# Patient Record
Sex: FEMALE | Race: BLACK OR AFRICAN AMERICAN | NOT HISPANIC OR LATINO
[De-identification: names, ages, dates, MRNs, and addresses within clinical notes are randomized per-mention and may not be internally consistent; named-entity substitution may affect disease eponyms.]

---

## 2018-12-06 ENCOUNTER — TRANSCRIPTION ENCOUNTER (OUTPATIENT)
Age: 23
End: 2018-12-06

## 2019-04-24 PROBLEM — Z00.00 ENCOUNTER FOR PREVENTIVE HEALTH EXAMINATION: Status: ACTIVE | Noted: 2019-04-24

## 2019-04-26 ENCOUNTER — APPOINTMENT (OUTPATIENT)
Dept: ULTRASOUND IMAGING | Facility: CLINIC | Age: 24
End: 2019-04-26
Payer: COMMERCIAL

## 2019-04-26 ENCOUNTER — TRANSCRIPTION ENCOUNTER (OUTPATIENT)
Age: 24
End: 2019-04-26

## 2019-04-26 ENCOUNTER — OUTPATIENT (OUTPATIENT)
Dept: OUTPATIENT SERVICES | Facility: HOSPITAL | Age: 24
LOS: 1 days | End: 2019-04-26
Payer: COMMERCIAL

## 2019-04-26 DIAGNOSIS — Z00.8 ENCOUNTER FOR OTHER GENERAL EXAMINATION: ICD-10-CM

## 2019-04-26 DIAGNOSIS — Z98.89 OTHER SPECIFIED POSTPROCEDURAL STATES: Chronic | ICD-10-CM

## 2019-04-26 PROCEDURE — 76536 US EXAM OF HEAD AND NECK: CPT | Mod: 26

## 2019-04-26 PROCEDURE — 76536 US EXAM OF HEAD AND NECK: CPT

## 2019-06-07 ENCOUNTER — APPOINTMENT (OUTPATIENT)
Dept: ULTRASOUND IMAGING | Facility: HOSPITAL | Age: 24
End: 2019-06-07

## 2019-07-27 ENCOUNTER — RESULT REVIEW (OUTPATIENT)
Age: 24
End: 2019-07-27

## 2019-08-23 ENCOUNTER — APPOINTMENT (OUTPATIENT)
Dept: ULTRASOUND IMAGING | Facility: IMAGING CENTER | Age: 24
End: 2019-08-23

## 2019-10-01 ENCOUNTER — OUTPATIENT (OUTPATIENT)
Dept: OUTPATIENT SERVICES | Facility: HOSPITAL | Age: 24
LOS: 1 days | End: 2019-10-01
Payer: COMMERCIAL

## 2019-10-01 ENCOUNTER — APPOINTMENT (OUTPATIENT)
Dept: ULTRASOUND IMAGING | Facility: IMAGING CENTER | Age: 24
End: 2019-10-01
Payer: COMMERCIAL

## 2019-10-01 DIAGNOSIS — Z98.89 OTHER SPECIFIED POSTPROCEDURAL STATES: Chronic | ICD-10-CM

## 2019-10-01 DIAGNOSIS — Z00.8 ENCOUNTER FOR OTHER GENERAL EXAMINATION: ICD-10-CM

## 2019-10-01 PROCEDURE — 76830 TRANSVAGINAL US NON-OB: CPT | Mod: 26

## 2019-10-01 PROCEDURE — 76856 US EXAM PELVIC COMPLETE: CPT

## 2019-10-01 PROCEDURE — 76856 US EXAM PELVIC COMPLETE: CPT | Mod: 26

## 2019-10-01 PROCEDURE — 76830 TRANSVAGINAL US NON-OB: CPT

## 2019-10-16 ENCOUNTER — APPOINTMENT (OUTPATIENT)
Dept: OBGYN | Facility: CLINIC | Age: 24
End: 2019-10-16

## 2019-10-31 ENCOUNTER — APPOINTMENT (OUTPATIENT)
Dept: BARIATRICS/WEIGHT MGMT | Facility: CLINIC | Age: 24
End: 2019-10-31

## 2020-02-26 ENCOUNTER — APPOINTMENT (OUTPATIENT)
Dept: OBGYN | Facility: CLINIC | Age: 25
End: 2020-02-26

## 2020-03-11 ENCOUNTER — TRANSCRIPTION ENCOUNTER (OUTPATIENT)
Age: 25
End: 2020-03-11

## 2020-04-26 ENCOUNTER — MESSAGE (OUTPATIENT)
Age: 25
End: 2020-04-26

## 2020-05-05 ENCOUNTER — APPOINTMENT (OUTPATIENT)
Dept: ANTEPARTUM | Facility: CLINIC | Age: 25
End: 2020-05-05
Payer: COMMERCIAL

## 2020-05-05 ENCOUNTER — APPOINTMENT (OUTPATIENT)
Dept: ANTEPARTUM | Facility: CLINIC | Age: 25
End: 2020-05-05

## 2020-05-05 ENCOUNTER — ASOB RESULT (OUTPATIENT)
Age: 25
End: 2020-05-05

## 2020-05-05 PROCEDURE — 36416 COLLJ CAPILLARY BLOOD SPEC: CPT

## 2020-05-05 PROCEDURE — 76813 OB US NUCHAL MEAS 1 GEST: CPT

## 2020-05-05 PROCEDURE — 76801 OB US < 14 WKS SINGLE FETUS: CPT

## 2020-05-06 ENCOUNTER — APPOINTMENT (OUTPATIENT)
Dept: ANTEPARTUM | Facility: CLINIC | Age: 25
End: 2020-05-06

## 2020-05-06 ENCOUNTER — LABORATORY RESULT (OUTPATIENT)
Age: 25
End: 2020-05-06

## 2020-06-19 ENCOUNTER — APPOINTMENT (OUTPATIENT)
Dept: ANTEPARTUM | Facility: CLINIC | Age: 25
End: 2020-06-19
Payer: COMMERCIAL

## 2020-06-19 ENCOUNTER — ASOB RESULT (OUTPATIENT)
Age: 25
End: 2020-06-19

## 2020-06-19 PROCEDURE — 76811 OB US DETAILED SNGL FETUS: CPT

## 2020-08-28 ENCOUNTER — APPOINTMENT (OUTPATIENT)
Dept: ANTEPARTUM | Facility: CLINIC | Age: 25
End: 2020-08-28
Payer: COMMERCIAL

## 2020-08-28 ENCOUNTER — ASOB RESULT (OUTPATIENT)
Age: 25
End: 2020-08-28

## 2020-08-28 PROCEDURE — 76816 OB US FOLLOW-UP PER FETUS: CPT

## 2020-09-04 ENCOUNTER — APPOINTMENT (OUTPATIENT)
Dept: ANTEPARTUM | Facility: CLINIC | Age: 25
End: 2020-09-04

## 2020-10-14 ENCOUNTER — APPOINTMENT (OUTPATIENT)
Dept: ANTEPARTUM | Facility: CLINIC | Age: 25
End: 2020-10-14

## 2020-10-14 ENCOUNTER — APPOINTMENT (OUTPATIENT)
Dept: ANTEPARTUM | Facility: CLINIC | Age: 25
End: 2020-10-14
Payer: COMMERCIAL

## 2020-10-14 ENCOUNTER — ASOB RESULT (OUTPATIENT)
Age: 25
End: 2020-10-14

## 2020-10-14 PROCEDURE — 76818 FETAL BIOPHYS PROFILE W/NST: CPT

## 2020-10-14 PROCEDURE — 76816 OB US FOLLOW-UP PER FETUS: CPT

## 2020-10-21 ENCOUNTER — APPOINTMENT (OUTPATIENT)
Dept: ANTEPARTUM | Facility: CLINIC | Age: 25
End: 2020-10-21

## 2020-10-21 ENCOUNTER — ASOB RESULT (OUTPATIENT)
Age: 25
End: 2020-10-21

## 2020-10-21 ENCOUNTER — APPOINTMENT (OUTPATIENT)
Dept: ANTEPARTUM | Facility: CLINIC | Age: 25
End: 2020-10-21
Payer: COMMERCIAL

## 2020-10-21 ENCOUNTER — TRANSCRIPTION ENCOUNTER (OUTPATIENT)
Age: 25
End: 2020-10-21

## 2020-10-21 ENCOUNTER — INPATIENT (INPATIENT)
Facility: HOSPITAL | Age: 25
LOS: 2 days | Discharge: HOME | End: 2020-10-24
Attending: OBSTETRICS & GYNECOLOGY | Admitting: OBSTETRICS & GYNECOLOGY
Payer: COMMERCIAL

## 2020-10-21 VITALS
RESPIRATION RATE: 18 BRPM | DIASTOLIC BLOOD PRESSURE: 81 MMHG | SYSTOLIC BLOOD PRESSURE: 128 MMHG | TEMPERATURE: 99 F | HEART RATE: 99 BPM

## 2020-10-21 DIAGNOSIS — Z98.89 OTHER SPECIFIED POSTPROCEDURAL STATES: Chronic | ICD-10-CM

## 2020-10-21 DIAGNOSIS — O26.899 OTHER SPECIFIED PREGNANCY RELATED CONDITIONS, UNSPECIFIED TRIMESTER: ICD-10-CM

## 2020-10-21 DIAGNOSIS — Z3A.00 WEEKS OF GESTATION OF PREGNANCY NOT SPECIFIED: ICD-10-CM

## 2020-10-21 LAB
BASOPHILS # BLD AUTO: 0.03 K/UL — SIGNIFICANT CHANGE UP (ref 0–0.2)
BASOPHILS NFR BLD AUTO: 0.3 % — SIGNIFICANT CHANGE UP (ref 0–2)
BLD GP AB SCN SERPL QL: NEGATIVE — SIGNIFICANT CHANGE UP
EOSINOPHIL # BLD AUTO: 0.03 K/UL — SIGNIFICANT CHANGE UP (ref 0–0.5)
EOSINOPHIL NFR BLD AUTO: 0.3 % — SIGNIFICANT CHANGE UP (ref 0–6)
HCT VFR BLD CALC: 30.4 % — LOW (ref 34.5–45)
HGB BLD-MCNC: 9.1 G/DL — LOW (ref 11.5–15.5)
IMM GRANULOCYTES NFR BLD AUTO: 3.5 % — HIGH (ref 0–1.5)
LYMPHOCYTES # BLD AUTO: 1.6 K/UL — SIGNIFICANT CHANGE UP (ref 1–3.3)
LYMPHOCYTES # BLD AUTO: 14.3 % — SIGNIFICANT CHANGE UP (ref 13–44)
MCHC RBC-ENTMCNC: 21.7 PG — LOW (ref 27–34)
MCHC RBC-ENTMCNC: 29.9 % — LOW (ref 32–36)
MCV RBC AUTO: 72.4 FL — LOW (ref 80–100)
MONOCYTES # BLD AUTO: 0.95 K/UL — HIGH (ref 0–0.9)
MONOCYTES NFR BLD AUTO: 8.5 % — SIGNIFICANT CHANGE UP (ref 2–14)
NEUTROPHILS # BLD AUTO: 8.19 K/UL — HIGH (ref 1.8–7.4)
NEUTROPHILS NFR BLD AUTO: 73.1 % — SIGNIFICANT CHANGE UP (ref 43–77)
NRBC # FLD: 0 K/UL — SIGNIFICANT CHANGE UP (ref 0–0)
PLATELET # BLD AUTO: 313 K/UL — SIGNIFICANT CHANGE UP (ref 150–400)
PMV BLD: 11.2 FL — SIGNIFICANT CHANGE UP (ref 7–13)
RBC # BLD: 4.2 M/UL — SIGNIFICANT CHANGE UP (ref 3.8–5.2)
RBC # FLD: 16.3 % — HIGH (ref 10.3–14.5)
RH IG SCN BLD-IMP: POSITIVE — SIGNIFICANT CHANGE UP
RH IG SCN BLD-IMP: POSITIVE — SIGNIFICANT CHANGE UP
WBC # BLD: 11.19 K/UL — HIGH (ref 3.8–10.5)
WBC # FLD AUTO: 11.19 K/UL — HIGH (ref 3.8–10.5)

## 2020-10-21 PROCEDURE — 76819 FETAL BIOPHYS PROFIL W/O NST: CPT

## 2020-10-21 PROCEDURE — 99072 ADDL SUPL MATRL&STAF TM PHE: CPT

## 2020-10-21 RX ORDER — SODIUM CHLORIDE 9 MG/ML
1000 INJECTION, SOLUTION INTRAVENOUS ONCE
Refills: 0 | Status: DISCONTINUED | OUTPATIENT
Start: 2020-10-21 | End: 2020-10-21

## 2020-10-21 RX ORDER — SODIUM CHLORIDE 9 MG/ML
1000 INJECTION, SOLUTION INTRAVENOUS
Refills: 0 | Status: DISCONTINUED | OUTPATIENT
Start: 2020-10-21 | End: 2020-10-21

## 2020-10-21 RX ORDER — CITRIC ACID/SODIUM CITRATE 300-500 MG
30 SOLUTION, ORAL ORAL ONCE
Refills: 0 | Status: DISCONTINUED | OUTPATIENT
Start: 2020-10-21 | End: 2020-10-21

## 2020-10-21 RX ORDER — FAMOTIDINE 10 MG/ML
20 INJECTION INTRAVENOUS ONCE
Refills: 0 | Status: DISCONTINUED | OUTPATIENT
Start: 2020-10-21 | End: 2020-10-21

## 2020-10-21 RX ORDER — METOCLOPRAMIDE HCL 10 MG
10 TABLET ORAL ONCE
Refills: 0 | Status: DISCONTINUED | OUTPATIENT
Start: 2020-10-21 | End: 2020-10-21

## 2020-10-21 RX ORDER — OXYTOCIN 10 UNIT/ML
333.33 VIAL (ML) INJECTION
Qty: 20 | Refills: 0 | Status: DISCONTINUED | OUTPATIENT
Start: 2020-10-21 | End: 2020-10-21

## 2020-10-21 NOTE — OB PROVIDER H&P - NSHPPHYSICALEXAM_GEN_ALL_CORE
Vitals:   T(C): 37.3 (10-21-20 @ 17:56), Max: 37.4 (10-21-20 @ 17:41)  HR: 99 (10-21-20 @ 17:41) (99 - 99)  BP: 128/81 (10-21-20 @ 17:41) (128/81 - 128/81)  RR: 18 (10-21-20 @ 17:41) (18 - 18)  SpO2: --    Exam:       General: Patient is resting comfortably in bed, alert and oriented, NAD       CV: RRR       PULM: CTA BL       Abd: Soft, nontender. Gravid       Extremities: Full ROM, no edema      TAUS: Cephalic, VIKI=0. Anterior placenta      SVE: 0/0/-3         EFM: 135 moderate variability +accels -decels        [place]: Ctx q4min, not felt by pt

## 2020-10-21 NOTE — OB PROVIDER H&P - ASSESSMENT
Assessment: 24yo  presents at 36w5d with oligohydramnios, for repeat  section:     Plan:    - Repeat  section    - Fetus: Category I tracing. Continuous EFM/toco    - Routine preoperative labs: Pepcid, bicitra, reglan    - Analgesia: Anesthesia consult     Martha Sadler PGY-2  Discussed with Dr. Pompa

## 2020-10-21 NOTE — CHART NOTE - NSCHARTNOTEFT_GEN_A_CORE
HERBERT FELLOW CONTACT NOTE:     26yo  presenting at 36w5d with oligohydramnios noted on ATU ultrasound. Pateint was seen at the ATU last week with low normal VIKI noted at 7cm, and had f/u scheduled today. When presenting today, VIKI was noted to be at 4.95cm and pateint sent to triage for r/o rupture. W/o on L&D negative for PPROM, and repeat US failed to detect any 2x2cm pockets of fluid. Prenatal course uncomplicated thus far. Patient with hx of CD x 1 for NRFHT.     - Given oligohydramnios noted, delivery would be recommended, though non-urgent. COVID swab sent and pending.   - For GBS swab, admission labs   - Given COVID pending and timing for NPO 8 hrs done at 1am, reasonable to monitor paitent overnight and reassess fluid volume in the am after fluid resuscitation.   - Given gestational age > 36 weeks and recent data suggestion potential long term sequelae of  corticosteroid administration, would not recommend steroids at this time.     HERBERT Youssef Fellow   HERBERT Leo Attending
patient case discussed with M Dr. Thompson. patient's covid status is pending - due to nonurgency of  section - will wait until covid labs are back and possible ultrasound again tomorrow morning to re-evaluate VIKI.  discussed with patient plan of care - pt verbalized understanding and agreed.
patient has oligohydramnios - 4.95cm noted on BPP at ATU this afternoon.  Discussed case with Dr. Thompson - repeat BPP showed minimal fluid <4cm - as per Dr. Thompson - patient should be admitted for delivery.  Patient is having irregular contractions however comfortable.  patient has h/o previous  section in  for nonreassuring fetal status.  patient is closed/long/high vaginal exam and cephalic.  Discussed with patient that we can not induce patient with a closed cervix due to previous h/o  section and we can deliver via  section.   discussed risks of repeat  section - bladder/bowel injury, blood transfusion.  patient verbalized understanding and agreed.  anesthesia and nursing staff aware.  patient last ate at 2pm - as per anesthesia - we have to wait until 10pm.

## 2020-10-21 NOTE — OB PROVIDER H&P - HISTORY OF PRESENT ILLNESS
26yo  presenting at 36w5d with oligohydramnios noted on ATU ultrasound (documented VIKI of 4.95). Denies loss of fluid or increased discharge, denies sensation of urinary incontinence.  Patient endorses GFM. Denies contractions.  Denies vaginal bleeding.        PNC: Uncomplicated per patient.       EFW: 2466g        OBHx: pLTCS () 2/ Category II tracing       PGynHx: Denies      PMHx: Fe deficiency anemia. Denies h/o asthma or HTN       PSurgHx:        Meds: PNV, Fe      Allergies: Penicillin (hives)       Social: Denies tobacco, alcohol, illicit drug use

## 2020-10-22 ENCOUNTER — RESULT REVIEW (OUTPATIENT)
Age: 25
End: 2020-10-22

## 2020-10-22 LAB
RUBV IGG SER-ACNC: 1.7 INDEX — SIGNIFICANT CHANGE UP
RUBV IGG SER-IMP: POSITIVE — SIGNIFICANT CHANGE UP
SARS-COV-2 IGG SERPL QL IA: NEGATIVE — SIGNIFICANT CHANGE UP
SARS-COV-2 IGM SERPL IA-ACNC: 0.09 INDEX — SIGNIFICANT CHANGE UP
SARS-COV-2 RNA SPEC QL NAA+PROBE: SIGNIFICANT CHANGE UP
T PALLIDUM AB TITR SER: NEGATIVE — SIGNIFICANT CHANGE UP

## 2020-10-22 PROCEDURE — 88307 TISSUE EXAM BY PATHOLOGIST: CPT | Mod: 26

## 2020-10-22 RX ORDER — DIPHENHYDRAMINE HCL 50 MG
25 CAPSULE ORAL EVERY 6 HOURS
Refills: 0 | Status: DISCONTINUED | OUTPATIENT
Start: 2020-10-22 | End: 2020-10-24

## 2020-10-22 RX ORDER — ONDANSETRON 8 MG/1
4 TABLET, FILM COATED ORAL EVERY 6 HOURS
Refills: 0 | Status: DISCONTINUED | OUTPATIENT
Start: 2020-10-22 | End: 2020-10-24

## 2020-10-22 RX ORDER — SIMETHICONE 80 MG/1
80 TABLET, CHEWABLE ORAL EVERY 4 HOURS
Refills: 0 | Status: DISCONTINUED | OUTPATIENT
Start: 2020-10-22 | End: 2020-10-24

## 2020-10-22 RX ORDER — OXYTOCIN 10 UNIT/ML
333.33 VIAL (ML) INJECTION
Qty: 20 | Refills: 0 | Status: DISCONTINUED | OUTPATIENT
Start: 2020-10-22 | End: 2020-10-22

## 2020-10-22 RX ORDER — SODIUM CHLORIDE 9 MG/ML
1000 INJECTION, SOLUTION INTRAVENOUS
Refills: 0 | Status: DISCONTINUED | OUTPATIENT
Start: 2020-10-22 | End: 2020-10-22

## 2020-10-22 RX ORDER — IBUPROFEN 200 MG
600 TABLET ORAL EVERY 6 HOURS
Refills: 0 | Status: COMPLETED | OUTPATIENT
Start: 2020-10-22 | End: 2021-09-20

## 2020-10-22 RX ORDER — HEPARIN SODIUM 5000 [USP'U]/ML
5000 INJECTION INTRAVENOUS; SUBCUTANEOUS EVERY 12 HOURS
Refills: 0 | Status: DISCONTINUED | OUTPATIENT
Start: 2020-10-22 | End: 2020-10-24

## 2020-10-22 RX ORDER — OXYCODONE HYDROCHLORIDE 5 MG/1
5 TABLET ORAL
Refills: 0 | Status: DISCONTINUED | OUTPATIENT
Start: 2020-10-22 | End: 2020-10-23

## 2020-10-22 RX ORDER — LANOLIN
1 OINTMENT (GRAM) TOPICAL EVERY 6 HOURS
Refills: 0 | Status: DISCONTINUED | OUTPATIENT
Start: 2020-10-22 | End: 2020-10-24

## 2020-10-22 RX ORDER — OXYCODONE HYDROCHLORIDE 5 MG/1
5 TABLET ORAL
Refills: 0 | Status: COMPLETED | OUTPATIENT
Start: 2020-10-22 | End: 2020-10-29

## 2020-10-22 RX ORDER — FAMOTIDINE 10 MG/ML
20 INJECTION INTRAVENOUS ONCE
Refills: 0 | Status: COMPLETED | OUTPATIENT
Start: 2020-10-22 | End: 2020-10-22

## 2020-10-22 RX ORDER — TETANUS TOXOID, REDUCED DIPHTHERIA TOXOID AND ACELLULAR PERTUSSIS VACCINE, ADSORBED 5; 2.5; 8; 8; 2.5 [IU]/.5ML; [IU]/.5ML; UG/.5ML; UG/.5ML; UG/.5ML
0.5 SUSPENSION INTRAMUSCULAR ONCE
Refills: 0 | Status: DISCONTINUED | OUTPATIENT
Start: 2020-10-22 | End: 2020-10-24

## 2020-10-22 RX ORDER — SODIUM CHLORIDE 9 MG/ML
1000 INJECTION, SOLUTION INTRAVENOUS ONCE
Refills: 0 | Status: COMPLETED | OUTPATIENT
Start: 2020-10-22 | End: 2020-10-22

## 2020-10-22 RX ORDER — CITRIC ACID/SODIUM CITRATE 300-500 MG
30 SOLUTION, ORAL ORAL ONCE
Refills: 0 | Status: COMPLETED | OUTPATIENT
Start: 2020-10-22 | End: 2020-10-22

## 2020-10-22 RX ORDER — OXYCODONE HYDROCHLORIDE 5 MG/1
5 TABLET ORAL ONCE
Refills: 0 | Status: DISCONTINUED | OUTPATIENT
Start: 2020-10-22 | End: 2020-10-24

## 2020-10-22 RX ORDER — MAGNESIUM HYDROXIDE 400 MG/1
30 TABLET, CHEWABLE ORAL
Refills: 0 | Status: DISCONTINUED | OUTPATIENT
Start: 2020-10-22 | End: 2020-10-24

## 2020-10-22 RX ORDER — OXYCODONE HYDROCHLORIDE 5 MG/1
10 TABLET ORAL
Refills: 0 | Status: DISCONTINUED | OUTPATIENT
Start: 2020-10-22 | End: 2020-10-23

## 2020-10-22 RX ORDER — NALOXONE HYDROCHLORIDE 4 MG/.1ML
0.1 SPRAY NASAL
Refills: 0 | Status: DISCONTINUED | OUTPATIENT
Start: 2020-10-22 | End: 2020-10-23

## 2020-10-22 RX ORDER — MORPHINE SULFATE 50 MG/1
0.15 CAPSULE, EXTENDED RELEASE ORAL ONCE
Refills: 0 | Status: DISCONTINUED | OUTPATIENT
Start: 2020-10-22 | End: 2020-10-23

## 2020-10-22 RX ORDER — KETOROLAC TROMETHAMINE 30 MG/ML
30 SYRINGE (ML) INJECTION EVERY 6 HOURS
Refills: 0 | Status: DISCONTINUED | OUTPATIENT
Start: 2020-10-22 | End: 2020-10-23

## 2020-10-22 RX ORDER — ONDANSETRON 8 MG/1
4 TABLET, FILM COATED ORAL ONCE
Refills: 0 | Status: DISCONTINUED | OUTPATIENT
Start: 2020-10-22 | End: 2020-10-22

## 2020-10-22 RX ORDER — ACETAMINOPHEN 500 MG
975 TABLET ORAL
Refills: 0 | Status: DISCONTINUED | OUTPATIENT
Start: 2020-10-22 | End: 2020-10-24

## 2020-10-22 RX ORDER — METOCLOPRAMIDE HCL 10 MG
10 TABLET ORAL ONCE
Refills: 0 | Status: COMPLETED | OUTPATIENT
Start: 2020-10-22 | End: 2020-10-22

## 2020-10-22 RX ORDER — HYDROMORPHONE HYDROCHLORIDE 2 MG/ML
1 INJECTION INTRAMUSCULAR; INTRAVENOUS; SUBCUTANEOUS
Refills: 0 | Status: DISCONTINUED | OUTPATIENT
Start: 2020-10-22 | End: 2020-10-23

## 2020-10-22 RX ADMIN — Medication 10 MILLIGRAM(S): at 11:30

## 2020-10-22 RX ADMIN — SODIUM CHLORIDE 2000 MILLILITER(S): 9 INJECTION, SOLUTION INTRAVENOUS at 11:00

## 2020-10-22 RX ADMIN — FAMOTIDINE 20 MILLIGRAM(S): 10 INJECTION INTRAVENOUS at 11:30

## 2020-10-22 RX ADMIN — HEPARIN SODIUM 5000 UNIT(S): 5000 INJECTION INTRAVENOUS; SUBCUTANEOUS at 20:09

## 2020-10-22 RX ADMIN — Medication 30 MILLILITER(S): at 11:30

## 2020-10-22 RX ADMIN — Medication 30 MILLIGRAM(S): at 21:00

## 2020-10-22 RX ADMIN — Medication 30 MILLIGRAM(S): at 20:09

## 2020-10-22 RX ADMIN — Medication 975 MILLIGRAM(S): at 17:30

## 2020-10-22 RX ADMIN — SODIUM CHLORIDE 125 MILLILITER(S): 9 INJECTION, SOLUTION INTRAVENOUS at 00:15

## 2020-10-22 NOTE — OB NEONATOLOGY/PEDIATRICIAN DELIVERY SUMMARY - NSNURSERYA_OBGYN_ALL_OB
Prescription approved per [facility] Refill Protocol.  Pt advised.    [facility]

## 2020-10-22 NOTE — PROGRESS NOTE ADULT - ASSESSMENT
26yo  at 36w6d with anhydramnios and overall reassuring tracing but intermittent spontaneous decels. Awaiting C/S    - repeat sono VIKI =0  - for repeat section today  - NPO, IVF  - anesthesia aware  - NICU aware    JEREMIAS Hussein PGY3  d/w Dr. Wilson  d/w Dr. Thompson, [facility]

## 2020-10-22 NOTE — OB NEONATOLOGY/PEDIATRICIAN DELIVERY SUMMARY - NSPEDSNEONOTESA_OBGYN_ALL_OB_FT
Baby boy born at 36.6 wks via repeat CS to a 25 y/o  O+ blood type mother. Indication for delivery is category II tracing in the setting of oligo/anhydramnios (VIKI = 0). Maternal history of anemia, prior C/S () for category II tracing. No other significant maternal or prenatal history.  Prenatal labs: HIV negative, RPR nonreactive, rubella and hepatitis B pending at time of delivery. GBS negative on 10/17.  ROM at time of delivery with scant clear fluid. Baby emerged vigorous, crying, was w/d/s/s with APGARS of . Mom would like to breastfeed, consents to Hep B and consents to circ. EOS ___. Baby boy born at 36.6 wks via repeat CS to a 25 y/o  O+ blood type mother. Indication for delivery is category II tracing in the setting of anhydramnios (VIKI = 0). Maternal history of anemia, prior C/S () for category II tracing. No other significant maternal or prenatal history.  Prenatal labs: HIV negative, RPR nonreactive, rubella and hepatitis B pending at time of delivery. GBS negative on 10/17.  ROM at time of delivery with clear fluid. Baby emerged vigorous, crying, was w/d/s/s with APGARS of 9 and 9. CPAP 5/21% given for approximately 2 minutes due to nasal flaring and grunting, subsequently improved. Mom would like to breastfeed, consents to Hep B and consents to circ. EOS not applicable.

## 2020-10-22 NOTE — OB RN DELIVERY SUMMARY - NS_SEPSISRSKCALC_OBGYN_ALL_OB_FT
No temperature has been documented for this patient in CPN or on the OB Flowsheet. Ensure the highest temperature during labor was documented on the OB Flowsheet.  No gestational age at birth has been documented. Ensure delivery date/time has been entered above.  Rupture of membranes must be entered above.   EOS calculated successfully. EOS Risk Factor: 0.5/1000 live births ([facility] national incidence); GA=36w6d; Temp=99.3; ROM=0.017; GBS='Negative'; Antibiotics='No antibiotics or any antibiotics < 2 hrs prior to birth'

## 2020-10-22 NOTE — PROVIDER CONTACT NOTE (OTHER) - ASSESSMENT
lying bp 99/70 HR 79, sitting 122/67 HR 77, sitting 117/49 HR 92 pt stated "I feel lightheaded", output adequate

## 2020-10-22 NOTE — OB RN PREOPERATIVE CHECKLIST - NOTHING BY MOUTH SINCE
shortness of breath, or wheezing  Cardiovascular ROS: no chest pain or dyspnea on exertion, no syncope  Dermatological ROS: negative for - rash and skin lesion changes    Blood pressure 138/74, pulse 80, height 5' 7.01\" (1.702 m), weight 230 lb 8 oz (104.6 kg). Physical Examination: General appearance - alert, well appearing, and in no distress  Mental status - alert, oriented to person, place, and time  Extremities - peripheral pulses normal, no pedal edema, no clubbing or cyanosis - right leg echymoses from mid calf to top of ankle. Left calf and knee is larger than right  Skin - normal coloration and turgor, warm, dry    Diagnostic Data: None    Assessment/Plan:   Diagnosis Orders   1. Hematoma of leg, left, initial encounter  Alfredo Cedeño MD, Orthopedic Surgery, [address]       Orders Placed This Encounter   Procedures   Kayla Gordon MD, Orthopedic Surgery, [facility] [address] [address]     Referral Priority:   Routine     Referral Type:   Eval and Treat     Referral Reason:   Specialty Services Required     Referred to Provider:   Kasandra Chávez MD     Requested Specialty:   Orthopedic Surgery     Number of Visits Requested:   1     Refer to OIO - concern for tear causing hematoma/extensive ecchymosis. Keep follow up appointment in a couple weeks for chronic issues - need to get labs done prior to visit. Continue medications at current doses. Dr. Hilary Coleman    [address]  [address] [place] [address], [place]    Phone number: [phone]  Fax number: [phone]
21-Oct-2020 11:49

## 2020-10-22 NOTE — OB PROVIDER DELIVERY SUMMARY - NSPROVIDERDELIVERYNOTE_OBGYN_ALL_OB_FT
repeat LTCS, uncomplicated  viable male infant, vertex/breech presentation, Apgars 9/9, cord gasses sent  Grossly normal fallopian tubes, uterus, and ovaries    EBL: 600  IVF: 600  UOP: 50    Kai PGY2  w/ Dr. Wilson repeat LTCS, uncomplicated  viable male infant, vertex presentation, Apgars 9/9, cord gasses sent  Grossly normal fallopian tubes, uterus, and ovaries  fibrillar placed hysterotomy    EBL: 600  IVF: 600  UOP: 50    Kai PGY2  w/ Dr. Wilson

## 2020-10-22 NOTE — PROVIDER CONTACT NOTE (OTHER) - ACTION/TREATMENT ORDERED:
martha rubi made aware pt told to increase PO fluids, khalil will be kept in, will repeat orthostatics and reassess pt

## 2020-10-23 LAB
BASOPHILS # BLD AUTO: 0.03 K/UL — SIGNIFICANT CHANGE UP (ref 0–0.2)
BASOPHILS NFR BLD AUTO: 0.2 % — SIGNIFICANT CHANGE UP (ref 0–2)
BASOPHILS NFR SPEC: 0 % — SIGNIFICANT CHANGE UP (ref 0–2)
DACRYOCYTES BLD QL SMEAR: SLIGHT — SIGNIFICANT CHANGE UP
ELLIPTOCYTES BLD QL SMEAR: SLIGHT — SIGNIFICANT CHANGE UP
EOSINOPHIL # BLD AUTO: 0.01 K/UL — SIGNIFICANT CHANGE UP (ref 0–0.5)
EOSINOPHIL NFR BLD AUTO: 0.1 % — SIGNIFICANT CHANGE UP (ref 0–6)
EOSINOPHIL NFR FLD: 0 % — SIGNIFICANT CHANGE UP (ref 0–6)
GIANT PLATELETS BLD QL SMEAR: PRESENT — SIGNIFICANT CHANGE UP
HCT VFR BLD CALC: 24.8 % — LOW (ref 34.5–45)
HGB BLD-MCNC: 7.6 G/DL — LOW (ref 11.5–15.5)
IMM GRANULOCYTES NFR BLD AUTO: 1.1 % — SIGNIFICANT CHANGE UP (ref 0–1.5)
LYMPHOCYTES # BLD AUTO: 1.1 K/UL — SIGNIFICANT CHANGE UP (ref 1–3.3)
LYMPHOCYTES # BLD AUTO: 6 % — LOW (ref 13–44)
LYMPHOCYTES NFR SPEC AUTO: 3.5 % — LOW (ref 13–44)
MCHC RBC-ENTMCNC: 22 PG — LOW (ref 27–34)
MCHC RBC-ENTMCNC: 30.6 % — LOW (ref 32–36)
MCV RBC AUTO: 71.9 FL — LOW (ref 80–100)
MONOCYTES # BLD AUTO: 2.14 K/UL — HIGH (ref 0–0.9)
MONOCYTES NFR BLD AUTO: 11.6 % — SIGNIFICANT CHANGE UP (ref 2–14)
MONOCYTES NFR BLD: 5.2 % — SIGNIFICANT CHANGE UP (ref 2–9)
MYELOCYTES NFR BLD: 0.9 % — HIGH (ref 0–0)
NEUTROPHIL AB SER-ACNC: 80.8 % — HIGH (ref 43–77)
NEUTROPHILS # BLD AUTO: 14.92 K/UL — HIGH (ref 1.8–7.4)
NEUTROPHILS NFR BLD AUTO: 81 % — HIGH (ref 43–77)
NEUTS BAND # BLD: 8.7 % — HIGH (ref 0–6)
NRBC # FLD: 0 K/UL — SIGNIFICANT CHANGE UP (ref 0–0)
OVALOCYTES BLD QL SMEAR: SLIGHT — SIGNIFICANT CHANGE UP
PLATELET # BLD AUTO: 267 K/UL — SIGNIFICANT CHANGE UP (ref 150–400)
PLATELET COUNT - ESTIMATE: NORMAL — SIGNIFICANT CHANGE UP
PMV BLD: 10.7 FL — SIGNIFICANT CHANGE UP (ref 7–13)
POIKILOCYTOSIS BLD QL AUTO: SLIGHT — SIGNIFICANT CHANGE UP
RBC # BLD: 3.45 M/UL — LOW (ref 3.8–5.2)
RBC # FLD: 16.1 % — HIGH (ref 10.3–14.5)
VARIANT LYMPHS # BLD: 0.9 % — SIGNIFICANT CHANGE UP
WBC # BLD: 18.41 K/UL — HIGH (ref 3.8–10.5)
WBC # FLD AUTO: 18.41 K/UL — HIGH (ref 3.8–10.5)

## 2020-10-23 RX ORDER — OXYCODONE HYDROCHLORIDE 5 MG/1
5 TABLET ORAL
Refills: 0 | Status: DISCONTINUED | OUTPATIENT
Start: 2020-10-23 | End: 2020-10-24

## 2020-10-23 RX ORDER — FERROUS SULFATE 325(65) MG
325 TABLET ORAL THREE TIMES A DAY
Refills: 0 | Status: DISCONTINUED | OUTPATIENT
Start: 2020-10-23 | End: 2020-10-24

## 2020-10-23 RX ORDER — ASCORBIC ACID 60 MG
500 TABLET,CHEWABLE ORAL DAILY
Refills: 0 | Status: DISCONTINUED | OUTPATIENT
Start: 2020-10-23 | End: 2020-10-24

## 2020-10-23 RX ORDER — IBUPROFEN 200 MG
600 TABLET ORAL EVERY 6 HOURS
Refills: 0 | Status: DISCONTINUED | OUTPATIENT
Start: 2020-10-23 | End: 2020-10-24

## 2020-10-23 RX ADMIN — Medication 600 MILLIGRAM(S): at 18:30

## 2020-10-23 RX ADMIN — Medication 325 MILLIGRAM(S): at 14:12

## 2020-10-23 RX ADMIN — OXYCODONE HYDROCHLORIDE 5 MILLIGRAM(S): 5 TABLET ORAL at 21:30

## 2020-10-23 RX ADMIN — Medication 975 MILLIGRAM(S): at 20:48

## 2020-10-23 RX ADMIN — HEPARIN SODIUM 5000 UNIT(S): 5000 INJECTION INTRAVENOUS; SUBCUTANEOUS at 08:30

## 2020-10-23 RX ADMIN — Medication 975 MILLIGRAM(S): at 14:12

## 2020-10-23 RX ADMIN — Medication 600 MILLIGRAM(S): at 09:05

## 2020-10-23 RX ADMIN — Medication 975 MILLIGRAM(S): at 01:00

## 2020-10-23 RX ADMIN — Medication 975 MILLIGRAM(S): at 06:16

## 2020-10-23 RX ADMIN — HEPARIN SODIUM 5000 UNIT(S): 5000 INJECTION INTRAVENOUS; SUBCUTANEOUS at 20:48

## 2020-10-23 RX ADMIN — Medication 30 MILLIGRAM(S): at 03:50

## 2020-10-23 RX ADMIN — SIMETHICONE 80 MILLIGRAM(S): 80 TABLET, CHEWABLE ORAL at 06:16

## 2020-10-23 RX ADMIN — Medication 975 MILLIGRAM(S): at 22:30

## 2020-10-23 RX ADMIN — Medication 975 MILLIGRAM(S): at 15:00

## 2020-10-23 RX ADMIN — Medication 500 MILLIGRAM(S): at 14:12

## 2020-10-23 RX ADMIN — Medication 975 MILLIGRAM(S): at 00:08

## 2020-10-23 RX ADMIN — Medication 30 MILLIGRAM(S): at 02:53

## 2020-10-23 RX ADMIN — OXYCODONE HYDROCHLORIDE 5 MILLIGRAM(S): 5 TABLET ORAL at 20:48

## 2020-10-23 RX ADMIN — Medication 600 MILLIGRAM(S): at 08:30

## 2020-10-23 NOTE — LACTATION INITIAL EVALUATION - LACTATION INTERVENTIONS
Infant in NBN at this time for phototherapy.  Infant born at 36.6 weeks gestational age.  Late  infant feeding behavior discussed. Feeding cues and feeding log reviewed.  Hand expression taught.  Care plan for breastfeeding infant born 35-37 weeks reviewed and given to patient.  Outpatient resources, warm line, virtual breastfeeding support group  discussed.  Encouraged patient to call for assistance and observation of next breastfeeding session when infant is in the room.  Primary RN made aware of consult and plan./initiate skin to skin/initiate hand expression routine/initiate dual electric pump routine

## 2020-10-24 ENCOUNTER — TRANSCRIPTION ENCOUNTER (OUTPATIENT)
Age: 25
End: 2020-10-24

## 2020-10-24 VITALS
DIASTOLIC BLOOD PRESSURE: 76 MMHG | HEART RATE: 93 BPM | SYSTOLIC BLOOD PRESSURE: 106 MMHG | RESPIRATION RATE: 17 BRPM | TEMPERATURE: 98 F | OXYGEN SATURATION: 100 %

## 2020-10-24 RX ADMIN — Medication 975 MILLIGRAM(S): at 07:52

## 2020-10-24 RX ADMIN — Medication 500 MILLIGRAM(S): at 13:38

## 2020-10-24 RX ADMIN — HEPARIN SODIUM 5000 UNIT(S): 5000 INJECTION INTRAVENOUS; SUBCUTANEOUS at 07:52

## 2020-10-24 RX ADMIN — Medication 600 MILLIGRAM(S): at 14:15

## 2020-10-24 RX ADMIN — Medication 600 MILLIGRAM(S): at 05:54

## 2020-10-24 RX ADMIN — Medication 325 MILLIGRAM(S): at 00:05

## 2020-10-24 RX ADMIN — Medication 600 MILLIGRAM(S): at 00:06

## 2020-10-24 RX ADMIN — Medication 600 MILLIGRAM(S): at 00:47

## 2020-10-24 RX ADMIN — Medication 600 MILLIGRAM(S): at 06:50

## 2020-10-24 RX ADMIN — Medication 600 MILLIGRAM(S): at 13:38

## 2020-10-24 RX ADMIN — Medication 325 MILLIGRAM(S): at 13:38

## 2020-10-24 RX ADMIN — Medication 325 MILLIGRAM(S): at 07:52

## 2020-10-24 RX ADMIN — Medication 975 MILLIGRAM(S): at 08:47

## 2020-10-24 NOTE — DISCHARGE NOTE OB - PATIENT PORTAL LINK FT
You can access the FollowMyHealth Patient Portal offered by [facility] by registering at the following website: [email or web address]. By joining [employer]’s FollowMyHealth portal, you will also be able to view your health information using other applications (apps) compatible with our system.

## 2020-10-24 NOTE — DISCHARGE NOTE OB - MATERIALS PROVIDED
Vaccinations/[facility]  Screening Program/  Immunization Record/Breastfeeding Log/Breastfeeding Mother’s Support Group Information/Guide to Postpartum Care/[facility] Hearing Screen Program/Back To Sleep Handout/Shaken Baby Prevention Handout/Breastfeeding Guide and Packet/Birth Certificate Instructions

## 2020-10-24 NOTE — DISCHARGE NOTE OB - CARE PLAN
Principal Discharge DX:	 delivery delivered  Goal:	pelvic rest x 6 weeks  Assessment and plan of treatment:	pelvic rest x 6 weeks

## 2020-10-24 NOTE — DISCHARGE NOTE OB - INCREASED VAGINAL BLEEDING OR LARGE CLOTS (SATURATING A PAD AN HOUR)
Statement Selected ostomy care and management/observation and assessment/teaching and training/medication teaching and assessment/wound care and assessment

## 2020-10-24 NOTE — DISCHARGE NOTE OB - CARE PROVIDER_API CALL
Karey Conklin  OBSTETRICS AND GYNECOLOGY  [address] [place] [address]  Phone: [phone]  Fax: [phone]  Follow Up Time:

## 2020-10-24 NOTE — PROGRESS NOTE ADULT - SUBJECTIVE AND OBJECTIVE BOX
Postpartum Note,  Section  She is a  25y woman who is now post-operative day: 1    Subjective:  The patient feels well.  She is tolerating regular diet.  She denies nausea and vomiting.  She is voiding.  She reports normal postpartum bleeding.  She is breastfeeding.  She is formula feeding.    Physical exam:    Vital Signs Last 24 Hrs  T(C): 36.8 (22 Oct 2020 22:07), Max: 37.2 (22 Oct 2020 01:37)  T(F): 98.2 (22 Oct 2020 22:07), Max: 98.9 (22 Oct 2020 01:37)  HR: 77 (22 Oct 2020 22:07) (76 - 105)  BP: 112/60 (22 Oct 2020 22:07) (96/52 - 134/102)  BP(mean): 72 (22 Oct 2020 17:00) (70 - 109)  RR: 18 (22 Oct 2020 22:07) (14 - 22)  SpO2: 100% (22 Oct 2020 22:07) (88% - 100%)    Gen: NAD  Breast: Soft, nontender, not engorged.  Abdomen: Soft, nontender, no distension , firm uterine fundus at umbilicus.  Incision: Clean, dry, and intact with steri strips  Pelvic: Normal lochia noted  Ext: No calf tenderness    LABS:                        9.1    11.19 )-----------( 313      ( 21 Oct 2020 20:00 )             30.4       Rubella status:     Allergies    penicillin (Hives)    Intolerances      MEDICATIONS  (STANDING):  acetaminophen   Tablet .. 975 milliGRAM(s) Oral <User Schedule>  diphtheria/tetanus/pertussis (acellular) Vaccine (ADAcel) 0.5 milliLiter(s) IntraMuscular once  heparin   Injectable 5000 Unit(s) SubCutaneous every 12 hours  ibuprofen  Tablet. 600 milliGRAM(s) Oral every 6 hours  ketorolac   Injectable 30 milliGRAM(s) IV Push every 6 hours  morphine PF Spinal 0.15 milliGRAM(s) IntraThecal. once    MEDICATIONS  (PRN):  diphenhydrAMINE 25 milliGRAM(s) Oral every 6 hours PRN Itching  HYDROmorphone  Injectable 1 milliGRAM(s) IV Push every 3 hours PRN Severe Pain (7 - 10)  lanolin Ointment 1 Application(s) Topical every 6 hours PRN Sore Nipples  magnesium hydroxide Suspension 30 milliLiter(s) Oral two times a day PRN Constipation  naloxone Injectable 0.1 milliGRAM(s) IV Push every 3 minutes PRN For ANY of the following changes in patient status:  A. RR LESS THAN 10 breaths per minute, B. Oxygen saturation LESS THAN 90%, C. Sedation score of 6  ondansetron Injectable 4 milliGRAM(s) IV Push every 6 hours PRN Nausea  oxyCODONE    IR 5 milliGRAM(s) Oral every 3 hours PRN Moderate to Severe Pain (4-10)  oxyCODONE    IR 5 milliGRAM(s) Oral once PRN Moderate to Severe Pain (4-10)  oxyCODONE    IR 5 milliGRAM(s) Oral every 3 hours PRN Mild Pain (1 - 3)  oxyCODONE    IR 10 milliGRAM(s) Oral every 3 hours PRN Moderate Pain (4 - 6)  simethicone 80 milliGRAM(s) Chew every 4 hours PRN Gas        Assessment and Plan  POD # 1  s/p  section  Doing well.  Encourage ambulation.  Continue routine post op care.        
R3 Antepartum Note, HD#2    Interval events: category 1 tracing overall but intermittent spontaneous decels. Patient seen and examined at bedside, no acute overnight events. No acute complaints. Pt reports +FM, denies LOF, VB, ctx, HA, epigastric pain, blurred vision, CP, SOB, N/V, fevers, and chills.    Vital Signs Last 24 Hours  T(C): 36.7 (10-22-20 @ 09:40), Max: 37.4 (10-21-20 @ 17:41)  HR: 87 (10-22-20 @ 09:40) (76 - 105)  BP: 100/54 (10-22-20 @ 09:40) (96/52 - 128/81)  RR: 18 (10-22-20 @ 09:40) (16 - 18)  SpO2: 99% (10-22-20 @ 09:40) (88% - 100%)    CAPILLARY BLOOD GLUCOSE      Physical Exam:  General: NAD  Abdomen: Soft, non-tender, gravid  Ext: No pain or swelling      Labs:             9.1    11.19 )-----------( 313      ( 10-21 @ 20:00 )             30.4         MEDICATIONS  (STANDING):  citric acid/sodium citrate Solution 30 milliLiter(s) Oral once  famotidine Injectable 20 milliGRAM(s) IV Push once  lactated ringers Bolus 1000 milliLiter(s) IV Bolus once  lactated ringers. 1000 milliLiter(s) (125 mL/Hr) IV Continuous <Continuous>  oxytocin Infusion 333.333 milliUNIT(s)/Min (1000 mL/Hr) IV Continuous <Continuous>    MEDICATIONS  (PRN):  metoclopramide Injectable 10 milliGRAM(s) IV Push once PRN Nausea and/or Vomiting  
S: 26yo POD#2 s/p repeat LTCS. . PMHx: iron deficient anemia.  Pain is well controlled. She is tolerating a regular diet and passing flatus. She is voiding spontaneously, and ambulating without difficulty. Denies CP/SOB. Denies lightheadedness/dizziness. Denies N/V.    O:  Vitals:  Vital Signs Last 24 Hrs  T(C): 36.7 (24 Oct 2020 05:02), Max: 37.1 (23 Oct 2020 22:00)  T(F): 98.1 (24 Oct 2020 05:02), Max: 98.8 (23 Oct 2020 22:00)  HR: 75 (24 Oct 2020 05:02) (75 - 90)  BP: 97/59 (24 Oct 2020 05:02) (95/55 - 112/56)  BP(mean): --  RR: 18 (24 Oct 2020 05:02) (16 - 18)  SpO2: 100% (24 Oct 2020 05:02) (99% - 100%)    MEDICATIONS  (STANDING):  acetaminophen   Tablet .. 975 milliGRAM(s) Oral <User Schedule>  ascorbic acid 500 milliGRAM(s) Oral daily  diphtheria/tetanus/pertussis (acellular) Vaccine (ADAcel) 0.5 milliLiter(s) IntraMuscular once  ferrous    sulfate 325 milliGRAM(s) Oral three times a day  heparin   Injectable 5000 Unit(s) SubCutaneous every 12 hours  ibuprofen  Tablet. 600 milliGRAM(s) Oral every 6 hours      MEDICATIONS  (PRN):  diphenhydrAMINE 25 milliGRAM(s) Oral every 6 hours PRN Itching  lanolin Ointment 1 Application(s) Topical every 6 hours PRN Sore Nipples  magnesium hydroxide Suspension 30 milliLiter(s) Oral two times a day PRN Constipation  ondansetron Injectable 4 milliGRAM(s) IV Push every 6 hours PRN Nausea  oxyCODONE    IR 5 milliGRAM(s) Oral once PRN Moderate to Severe Pain (4-10)  oxyCODONE    IR 5 milliGRAM(s) Oral every 3 hours PRN Moderate to Severe Pain (4-10)  simethicone 80 milliGRAM(s) Chew every 4 hours PRN Gas      Labs:  Blood type: O Positive  Rubella IgG: Positive (10-21 @ 20:00)  RPR: Negative                          7.6<L>   18.41<H> >-----------< 267    ( 10-23 @ 04:45 )             24.8<L>                        9.1<L>   11.19<H> >-----------< 313    ( 10-21 @ 20:00 )             30.4<L>        PE:  General: NAD  Abdomen: Soft, appropriately tender, incision c/d/i with steristrips  Lochia: WNL  Extremities: No erythema, no pitting edema    A/P: 26yo POD#2 s/p repeat LTCS.  Patient is stable and doing well post-operatively.    - Continue regular diet.  - Increase ambulation.  - Continue motrin, tylenol, oxycodone PRN for pain control  - Discharge planning    Charmaine ROMERO
OB Progress Note:  Delivery, POD#1    S: 26yo POD#1 s/p LTCS . Her pain is well controlled. She is tolerating a regular diet, voiding spontaneously, and passing flatus. Starting to ambulate slowly. No headache, RUQ pain, or vision changes. Denies chest pain, SOB, dizziness, lightheadedness, n/v, fever/chills, or any other concerns. No heavy vaginal bleeding.     O:   Vital Signs Last 24 Hrs  T(C): 36.9 (23 Oct 2020 05:03), Max: 37 (22 Oct 2020 16:15)  T(F): 98.4 (23 Oct 2020 05:03), Max: 98.6 (22 Oct 2020 16:15)  HR: 80 (23 Oct 2020 05:03) (76 - 105)  BP: 101/50 (23 Oct 2020 05:03) (100/54 - 134/102)  BP(mean): 72 (22 Oct 2020 17:00) (70 - 109)  RR: 18 (23 Oct 2020 05:03) (14 - 22)  SpO2: 98% (23 Oct 2020 05:03) (92% - 100%)    PE:  General: NAD  Abdomen: soft, discomfort with palpation, bandage removed, incision c/d/i with steri strips  Extremities: No erythema, no pitting edema    Labs:  Blood type: O Positive  Rubella IgG: Positive (10-21 @ 20:00)  RPR: Negative                          9.1<L>   11.19<H> >-----------< 313    ( 10-21 @ 20:00 )             30.4<L>          A/P: 26yo POD#1 s/p LTCS.  Patient is stable and doing well post-operatively.    - Continue regular diet.  - Increase ambulation.  - Continue motrin, tylenol, oxycodone PRN for pain control  - F/u AM CBC    Silvina Birmingham, PGY1
Pain Service Follow-up  Postop Day  1    S/P  C- Section    T(C): 36.9 (10-23-20 @ 05:03), Max: 37 (10-22-20 @ 16:15)  HR: 80 (10-23-20 @ 05:03) (76 - 95)  BP: 101/50 (10-23-20 @ 05:03) (100/54 - 134/102)  RR: 18 (10-23-20 @ 05:03) (14 - 22)  SpO2: 98% (10-23-20 @ 05:03) (98% - 100%)  Wt(kg): --      THERAPY:  Spinal Morphine     Sedation Score:	  [X] Alert	      [  ] Drowsy       [  ] Arousable	[  ] Asleep         [  ] Unresponsive    Side Effects:	  [X] None	      [  ] Nausea       [  ] Pruritus        [  ] Weakness   [  ] Numbness        ASSESSMENT/ PLAN   [ X ] Discontinue         [  ] Continue    [ X ] Documentation and Verification of current medications       Satisfactory Post Anesthetic Course

## 2021-02-22 ENCOUNTER — RESULT REVIEW (OUTPATIENT)
Age: 26
End: 2021-02-22

## 2021-03-31 NOTE — OB RN DELIVERY SUMMARY - NS_MECONIUTRACHA_OBGYN_ALL_OB
[General Appearance - Alert] : alert [General Appearance - In No Acute Distress] : in no acute distress [Sclera] : the sclera and conjunctiva were normal [Examination Of The Oral Cavity] : the lips and gums were normal [Oropharynx] : the oropharynx was normal [] : no respiratory distress [Auscultation Breath Sounds / Voice Sounds] : lungs were clear to auscultation bilaterally [Heart Rate And Rhythm] : heart rate was normal and rhythm regular [Edema] : there was no peripheral edema [No Spinal Tenderness] : no spinal tenderness [Abnormal Walk] : normal gait [Nail Clubbing] : no clubbing  or cyanosis of the fingernails [Musculoskeletal - Swelling] : no joint swelling seen [Motor Tone] : muscle strength and tone were normal [Motor Exam] : the motor exam was normal [Oriented To Time, Place, And Person] : oriented to person, place, and time [Impaired Insight] : insight and judgment were intact [FreeTextEntry1] : Lightening of hyperpigmented macules over the neck None

## 2022-04-17 NOTE — OB RN DELIVERY SUMMARY - NS_NEWBORNACONDIT_OBGYN_ALL_OB
GI, surgery consulted  - s/p emergent ERCP/biliary stent placement.  - managed nonoperatively, on IV Unasyn  -advanced to regular diet  - Per discussion with Surgery 4/16, no plan for inpatient cholecystectomy. Follow up in 2 weeks with surgery outpatient for elective cholecystectomy if desired.  - Followup in GI office in 2-4 weeks. Call [phone] to schedule.  [ ] Repeat ERCP in 4-6 weeks for stent removal and clearance of bile duct. [place]

## 2022-04-27 ENCOUNTER — APPOINTMENT (OUTPATIENT)
Dept: ULTRASOUND IMAGING | Facility: CLINIC | Age: 27
End: 2022-04-27

## 2022-05-01 ENCOUNTER — APPOINTMENT (OUTPATIENT)
Dept: ULTRASOUND IMAGING | Facility: IMAGING CENTER | Age: 27
End: 2022-05-01

## 2022-05-06 ENCOUNTER — RESULT REVIEW (OUTPATIENT)
Age: 27
End: 2022-05-06

## 2022-06-13 ENCOUNTER — APPOINTMENT (OUTPATIENT)
Dept: ANTEPARTUM | Facility: CLINIC | Age: 27
End: 2022-06-13
Payer: COMMERCIAL

## 2022-06-13 ENCOUNTER — ASOB RESULT (OUTPATIENT)
Age: 27
End: 2022-06-13

## 2022-06-13 DIAGNOSIS — O35.1XX1 MATERNAL CARE FOR (SUSPECTED) CHROMOSOMAL ABNORMALITY IN FETUS, FETUS 1: ICD-10-CM

## 2022-06-13 PROCEDURE — 36416 COLLJ CAPILLARY BLOOD SPEC: CPT

## 2022-06-13 PROCEDURE — 76813 OB US NUCHAL MEAS 1 GEST: CPT

## 2022-06-16 LAB
1ST TRIMESTER DATA: NORMAL
ADDENDUM DOC: NORMAL
AFP PNL SERPL: NORMAL
AFP SERPL-ACNC: NORMAL
CLINICAL BIOCHEMIST REVIEW: NORMAL
FREE BETA HCG 1ST TRIMESTER: NORMAL
Lab: NORMAL
NASAL BONE: PRESENT
NOTES NTD: NORMAL
NT: NORMAL
PAPP-A SERPL-ACNC: NORMAL
TRISOMY 18/3: NORMAL

## 2022-08-04 ENCOUNTER — APPOINTMENT (OUTPATIENT)
Dept: ANTEPARTUM | Facility: CLINIC | Age: 27
End: 2022-08-04

## 2022-08-04 ENCOUNTER — ASOB RESULT (OUTPATIENT)
Age: 27
End: 2022-08-04

## 2022-08-04 ENCOUNTER — LABORATORY RESULT (OUTPATIENT)
Age: 27
End: 2022-08-04

## 2022-08-04 PROCEDURE — 76817 TRANSVAGINAL US OBSTETRIC: CPT

## 2022-08-04 PROCEDURE — 36415 COLL VENOUS BLD VENIPUNCTURE: CPT

## 2022-08-04 PROCEDURE — 99213 OFFICE O/P EST LOW 20 MIN: CPT | Mod: 25

## 2022-08-04 PROCEDURE — 76811 OB US DETAILED SNGL FETUS: CPT

## 2022-08-08 ENCOUNTER — APPOINTMENT (OUTPATIENT)
Dept: ANTEPARTUM | Facility: CLINIC | Age: 27
End: 2022-08-08

## 2022-08-18 ENCOUNTER — NON-APPOINTMENT (OUTPATIENT)
Age: 27
End: 2022-08-18

## 2022-09-22 ENCOUNTER — ASOB RESULT (OUTPATIENT)
Age: 27
End: 2022-09-22

## 2022-09-22 ENCOUNTER — APPOINTMENT (OUTPATIENT)
Dept: ANTEPARTUM | Facility: CLINIC | Age: 27
End: 2022-09-22

## 2022-09-22 PROCEDURE — 76816 OB US FOLLOW-UP PER FETUS: CPT | Mod: 59

## 2022-09-22 PROCEDURE — 99212 OFFICE O/P EST SF 10 MIN: CPT | Mod: 25

## 2022-09-22 PROCEDURE — 76819 FETAL BIOPHYS PROFIL W/O NST: CPT

## 2022-09-30 ENCOUNTER — APPOINTMENT (OUTPATIENT)
Dept: ANTEPARTUM | Facility: CLINIC | Age: 27
End: 2022-09-30

## 2022-09-30 ENCOUNTER — ASOB RESULT (OUTPATIENT)
Age: 27
End: 2022-09-30

## 2022-09-30 PROCEDURE — 76819 FETAL BIOPHYS PROFIL W/O NST: CPT

## 2022-10-06 ENCOUNTER — APPOINTMENT (OUTPATIENT)
Dept: ANTEPARTUM | Facility: CLINIC | Age: 27
End: 2022-10-06

## 2022-10-07 ENCOUNTER — APPOINTMENT (OUTPATIENT)
Dept: ANTEPARTUM | Facility: CLINIC | Age: 27
End: 2022-10-07

## 2022-10-07 ENCOUNTER — ASOB RESULT (OUTPATIENT)
Age: 27
End: 2022-10-07

## 2022-10-07 PROCEDURE — 76819 FETAL BIOPHYS PROFIL W/O NST: CPT

## 2022-10-14 ENCOUNTER — ASOB RESULT (OUTPATIENT)
Age: 27
End: 2022-10-14

## 2022-10-14 ENCOUNTER — APPOINTMENT (OUTPATIENT)
Dept: ANTEPARTUM | Facility: CLINIC | Age: 27
End: 2022-10-14

## 2022-10-14 PROCEDURE — 76819 FETAL BIOPHYS PROFIL W/O NST: CPT

## 2022-10-21 ENCOUNTER — ASOB RESULT (OUTPATIENT)
Age: 27
End: 2022-10-21

## 2022-10-21 ENCOUNTER — APPOINTMENT (OUTPATIENT)
Dept: ANTEPARTUM | Facility: CLINIC | Age: 27
End: 2022-10-21

## 2022-10-21 PROCEDURE — 76816 OB US FOLLOW-UP PER FETUS: CPT

## 2022-10-21 PROCEDURE — 76819 FETAL BIOPHYS PROFIL W/O NST: CPT | Mod: 59

## 2022-10-28 ENCOUNTER — APPOINTMENT (OUTPATIENT)
Dept: ANTEPARTUM | Facility: CLINIC | Age: 27
End: 2022-10-28

## 2022-11-04 ENCOUNTER — ASOB RESULT (OUTPATIENT)
Age: 27
End: 2022-11-04

## 2022-11-04 ENCOUNTER — APPOINTMENT (OUTPATIENT)
Dept: ANTEPARTUM | Facility: CLINIC | Age: 27
End: 2022-11-04

## 2022-11-04 PROCEDURE — 76818 FETAL BIOPHYS PROFILE W/NST: CPT

## 2022-11-11 ENCOUNTER — APPOINTMENT (OUTPATIENT)
Dept: ANTEPARTUM | Facility: CLINIC | Age: 27
End: 2022-11-11

## 2022-11-11 ENCOUNTER — ASOB RESULT (OUTPATIENT)
Age: 27
End: 2022-11-11

## 2022-11-11 PROCEDURE — 76818 FETAL BIOPHYS PROFILE W/NST: CPT

## 2022-11-18 ENCOUNTER — APPOINTMENT (OUTPATIENT)
Dept: ANTEPARTUM | Facility: CLINIC | Age: 27
End: 2022-11-18

## 2022-11-18 ENCOUNTER — ASOB RESULT (OUTPATIENT)
Age: 27
End: 2022-11-18

## 2022-11-18 PROCEDURE — 76818 FETAL BIOPHYS PROFILE W/NST: CPT | Mod: 59

## 2022-11-18 PROCEDURE — 76816 OB US FOLLOW-UP PER FETUS: CPT

## 2022-11-19 ENCOUNTER — EMERGENCY (EMERGENCY)
Facility: HOSPITAL | Age: 27
LOS: 1 days | Discharge: HOME | End: 2022-11-19
Attending: STUDENT IN AN ORGANIZED HEALTH CARE EDUCATION/TRAINING PROGRAM | Admitting: STUDENT IN AN ORGANIZED HEALTH CARE EDUCATION/TRAINING PROGRAM

## 2022-11-19 VITALS
TEMPERATURE: 98 F | SYSTOLIC BLOOD PRESSURE: 118 MMHG | OXYGEN SATURATION: 100 % | RESPIRATION RATE: 16 BRPM | DIASTOLIC BLOOD PRESSURE: 70 MMHG | HEART RATE: 86 BPM

## 2022-11-19 DIAGNOSIS — Z98.89 OTHER SPECIFIED POSTPROCEDURAL STATES: Chronic | ICD-10-CM

## 2022-11-19 LAB
APPEARANCE UR: CLEAR — SIGNIFICANT CHANGE UP
B PERT DNA SPEC QL NAA+PROBE: SIGNIFICANT CHANGE UP
B PERT+PARAPERT DNA PNL SPEC NAA+PROBE: SIGNIFICANT CHANGE UP
BILIRUB UR-MCNC: NEGATIVE — SIGNIFICANT CHANGE UP
BORDETELLA PARAPERTUSSIS (RAPRVP): SIGNIFICANT CHANGE UP
C PNEUM DNA SPEC QL NAA+PROBE: SIGNIFICANT CHANGE UP
COLOR SPEC: SIGNIFICANT CHANGE UP
DIFF PNL FLD: NEGATIVE — SIGNIFICANT CHANGE UP
FLUAV SUBTYP SPEC NAA+PROBE: SIGNIFICANT CHANGE UP
FLUBV RNA SPEC QL NAA+PROBE: SIGNIFICANT CHANGE UP
GLUCOSE UR QL: NEGATIVE — SIGNIFICANT CHANGE UP
HADV DNA SPEC QL NAA+PROBE: SIGNIFICANT CHANGE UP
HCOV 229E RNA SPEC QL NAA+PROBE: SIGNIFICANT CHANGE UP
HCOV HKU1 RNA SPEC QL NAA+PROBE: SIGNIFICANT CHANGE UP
HCOV NL63 RNA SPEC QL NAA+PROBE: SIGNIFICANT CHANGE UP
HCOV OC43 RNA SPEC QL NAA+PROBE: SIGNIFICANT CHANGE UP
HMPV RNA SPEC QL NAA+PROBE: SIGNIFICANT CHANGE UP
HPIV1 RNA SPEC QL NAA+PROBE: SIGNIFICANT CHANGE UP
HPIV2 RNA SPEC QL NAA+PROBE: SIGNIFICANT CHANGE UP
HPIV3 RNA SPEC QL NAA+PROBE: SIGNIFICANT CHANGE UP
HPIV4 RNA SPEC QL NAA+PROBE: SIGNIFICANT CHANGE UP
KETONES UR-MCNC: ABNORMAL
LEUKOCYTE ESTERASE UR-ACNC: NEGATIVE — SIGNIFICANT CHANGE UP
M PNEUMO DNA SPEC QL NAA+PROBE: SIGNIFICANT CHANGE UP
NITRITE UR-MCNC: NEGATIVE — SIGNIFICANT CHANGE UP
PH UR: 7 — SIGNIFICANT CHANGE UP (ref 5–8)
PROT UR-MCNC: NEGATIVE — SIGNIFICANT CHANGE UP
RAPID RVP RESULT: SIGNIFICANT CHANGE UP
RBC CASTS # UR COMP ASSIST: 0 /HPF — SIGNIFICANT CHANGE UP (ref 0–4)
RSV RNA SPEC QL NAA+PROBE: SIGNIFICANT CHANGE UP
RV+EV RNA SPEC QL NAA+PROBE: SIGNIFICANT CHANGE UP
SARS-COV-2 RNA SPEC QL NAA+PROBE: SIGNIFICANT CHANGE UP
SP GR SPEC: 1.01 — SIGNIFICANT CHANGE UP (ref 1.01–1.05)
UROBILINOGEN FLD QL: SIGNIFICANT CHANGE UP
WBC UR QL: 1 /HPF — SIGNIFICANT CHANGE UP (ref 0–5)

## 2022-11-19 PROCEDURE — 99284 EMERGENCY DEPT VISIT MOD MDM: CPT

## 2022-11-19 NOTE — ED PROVIDER NOTE - PATIENT PORTAL LINK FT
You can access the FollowMyHealth Patient Portal offered by [facility] by registering at the following website: [email or web address]. By joining [employer]’s FollowMyHealth portal, you will also be able to view your health information using other applications (apps) compatible with our system.

## 2022-11-19 NOTE — ED PROVIDER NOTE - ATTENDING CONTRIBUTION TO CARE
I have personally performed a face to face medical and diagnostic evaluation of the patient. I have discussed with and reviewed the Resident's note and agree with the History, ROS, Physical Exam and MDM unless otherwise indicated. A brief summary of my personal evaluation and impression can be found below.    26y/o F  35 weeks pregnant c/b polyhydramnios presents with one week of cough now productive as of yesterday - white sputum. Patient also with fever [id number] 6 days ago, loss of taste and smell 4 days ago. Took 2 rapid covid tests at home that were negative. States son was sick last week as well. Denies chest pain, SOB, trouble swallowing. Still feeling fetal movements. No abdominal pain or urinary symptoms. On exam, VSS w FHR in 130s. Gravid abdomen w no tenderness. Clear lung with no wob. Likely viral based on story, possible post viral bronchitis. Will swab and check urine. PT will need NST. Ob called. Sherrie Yusuf, ED Attending

## 2022-11-19 NOTE — ED PROVIDER NOTE - NS ED ROS FT
HEENT: No trouble swallowing or speaking  CARDIAC: No chest pain  PULMONARY:  SOB  GI: No abdominal pain, no nausea or no vomiting, no diarrhea or constipation  : No changes in urination  Otherwise as HPI or negative.

## 2022-11-19 NOTE — CHART NOTE - NSCHARTNOTEFT_GEN_A_CORE
R2 OB/NST Note    Notified by ED of pt presence. 28 y/o  at 35w3d presenting to ED with primary complaint of sore throat, productive cough, with fevers 1 wk prior. Evaluation in ED for URI. No obstetrical complaints - no abdominal pain, leakage of fluid, vaginal bleeding. FH in 130s.    NST reactive and reassuring - baseline 130s w/ moderate variability and accelerations, no contractions on toco. Signed by PGY Gutierrez.    Covering attending Dr Guerra notified.    - Primary management/workup per ED  - No obstetrical complaints; no current need for OB eval. Please consult if OB complaints arise.    d/w  Yolanda Urban  PGY-2

## 2022-11-19 NOTE — ED PROVIDER NOTE - OBJECTIVE STATEMENT
26y/o F 35 weeks pregnant c/b polyhydramnios presents with one week of cough now productive as of yesterday. Patient aslo with fever [id number] 6 days ago, loss of taste and smell 4 days ago. Took 2 rapid covid tests at home that were negative. States sone was sick last week as well. Denies chest pain, SOB, trouble swallowing.

## 2022-11-19 NOTE — ED PROVIDER NOTE - CLINICAL SUMMARY MEDICAL DECISION MAKING FREE TEXT BOX
28y/o F 35 weeks pregnant c/b polyhydramnios presents with one week of cough now productive as of yesterday. Likely COVID. Will check FHR and discuss NST with OB/GYN.

## 2022-11-19 NOTE — ED PROVIDER NOTE - PROGRESS NOTE DETAILS
. OB/GYN aware. Will set up for NST.  -Jose Evans PGY-1 NST is wnl. Will DC w follow up. Sherrie Yusuf, ED Attending

## 2022-11-19 NOTE — ED ADULT TRIAGE NOTE - CHIEF COMPLAINT QUOTE
Pt c/o sore throat and productive cough x week. pt states it started to go away monday but last night symptoms got worse. Pt states its painful to swallow. took home covid test and was negative. Pt is 35 weeks pregnant due 12/21. Pt denies any vaginal bleeding/discharge or cramping. pt called L&D was was told to go to ED.  No complaints of chest pain, headache, nausea, dizziness, vomiting  SOB, fever, chills verbalized..

## 2022-11-20 LAB
CULTURE RESULTS: SIGNIFICANT CHANGE UP
SPECIMEN SOURCE: SIGNIFICANT CHANGE UP

## 2022-11-25 ENCOUNTER — ASOB RESULT (OUTPATIENT)
Age: 27
End: 2022-11-25

## 2022-11-25 ENCOUNTER — APPOINTMENT (OUTPATIENT)
Dept: ANTEPARTUM | Facility: CLINIC | Age: 27
End: 2022-11-25

## 2022-11-25 PROCEDURE — 76818 FETAL BIOPHYS PROFILE W/NST: CPT

## 2022-12-02 ENCOUNTER — ASOB RESULT (OUTPATIENT)
Age: 27
End: 2022-12-02

## 2022-12-02 ENCOUNTER — APPOINTMENT (OUTPATIENT)
Dept: ANTEPARTUM | Facility: CLINIC | Age: 27
End: 2022-12-02

## 2022-12-02 PROCEDURE — 76818 FETAL BIOPHYS PROFILE W/NST: CPT

## 2022-12-06 ENCOUNTER — OUTPATIENT (OUTPATIENT)
Dept: OUTPATIENT SERVICES | Facility: HOSPITAL | Age: 27
LOS: 1 days | End: 2022-12-06

## 2022-12-06 VITALS
SYSTOLIC BLOOD PRESSURE: 108 MMHG | OXYGEN SATURATION: 98 % | RESPIRATION RATE: 16 BRPM | DIASTOLIC BLOOD PRESSURE: 70 MMHG | HEART RATE: 88 BPM | TEMPERATURE: 98 F

## 2022-12-06 DIAGNOSIS — Z98.89 OTHER SPECIFIED POSTPROCEDURAL STATES: Chronic | ICD-10-CM

## 2022-12-06 DIAGNOSIS — O34.29 MATERNAL CARE DUE TO UTERINE SCAR FROM OTHER PREVIOUS SURGERY: ICD-10-CM

## 2022-12-06 DIAGNOSIS — Z34.90 ENCOUNTER FOR SUPERVISION OF NORMAL PREGNANCY, UNSPECIFIED, UNSPECIFIED TRIMESTER: ICD-10-CM

## 2022-12-06 LAB
APPEARANCE UR: CLEAR — SIGNIFICANT CHANGE UP
BILIRUB UR-MCNC: NEGATIVE — SIGNIFICANT CHANGE UP
BLD GP AB SCN SERPL QL: NEGATIVE — SIGNIFICANT CHANGE UP
COLOR SPEC: YELLOW — SIGNIFICANT CHANGE UP
DIFF PNL FLD: NEGATIVE — SIGNIFICANT CHANGE UP
GLUCOSE UR QL: NEGATIVE — SIGNIFICANT CHANGE UP
HCT VFR BLD CALC: 28.9 % — LOW (ref 34.5–45)
HGB BLD-MCNC: 9.1 G/DL — LOW (ref 11.5–15.5)
KETONES UR-MCNC: NEGATIVE — SIGNIFICANT CHANGE UP
LEUKOCYTE ESTERASE UR-ACNC: NEGATIVE — SIGNIFICANT CHANGE UP
MCHC RBC-ENTMCNC: 21.9 PG — LOW (ref 27–34)
MCHC RBC-ENTMCNC: 31.5 GM/DL — LOW (ref 32–36)
MCV RBC AUTO: 69.6 FL — LOW (ref 80–100)
NITRITE UR-MCNC: NEGATIVE — SIGNIFICANT CHANGE UP
NRBC # BLD: 0 /100 WBCS — SIGNIFICANT CHANGE UP (ref 0–0)
NRBC # FLD: 0 K/UL — SIGNIFICANT CHANGE UP (ref 0–0)
PH UR: 6.5 — SIGNIFICANT CHANGE UP (ref 5–8)
PLATELET # BLD AUTO: 316 K/UL — SIGNIFICANT CHANGE UP (ref 150–400)
PROT UR-MCNC: ABNORMAL
RBC # BLD: 4.15 M/UL — SIGNIFICANT CHANGE UP (ref 3.8–5.2)
RBC # FLD: 16.7 % — HIGH (ref 10.3–14.5)
RH IG SCN BLD-IMP: POSITIVE — SIGNIFICANT CHANGE UP
SP GR SPEC: 1.01 — SIGNIFICANT CHANGE UP (ref 1.01–1.05)
UROBILINOGEN FLD QL: SIGNIFICANT CHANGE UP
WBC # BLD: 13.03 K/UL — HIGH (ref 3.8–10.5)
WBC # FLD AUTO: 13.03 K/UL — HIGH (ref 3.8–10.5)

## 2022-12-06 NOTE — OB PST NOTE - ASSESSMENT
Pt is a 27 yr old female scheduled   Pt given information for COVID PCR testing sites and told to make appointment 3-5 days preop  Repeat

## 2022-12-06 NOTE — OB PST NOTE - HISTORY OF PRESENT ILLNESS
Pt is a 27 yr old female scheduled for Repeat  with Dr Conklin tentatively 12/15/22 - Gestation 38 weeks, pt seen by OB last Saturday and fetal heartbeat and activity monitored - pt hx   at 40 weeks for fetal distress and in  for low fluid - pt now denies vaginal bleeding or contractions and confirms fetal movement - BMI 40.6   Pt given information for COVID PCR testing sites and told to make appointment 3-5 days preop and is aware  needs COVID test preop as well

## 2022-12-06 NOTE — OB PST NOTE - NSHPREVIEWOFSYSTEMS_GEN_ALL_CORE
General: No fever, chills, sweating, anorexia, weight loss or weight gain. No polyphagia, polyurea, polydypsia, malaise, or fatigue    Skin: No rashes, itching, or dryness. No change in size/color of moles. No tumors, brittle nails, pitted nails, or hair loss    Breast: No tenderness, lumps, or nipple discharge      Ophthalmologic: No diplopia, photophobia, lacrimation, blurred Vision , or eye discharge    ENMT Symptoms: No hearing difficulty, ear pain, tinnitus, or vertigo. No sinus symptoms, nasal congestion, nasal   discharge, or nasal obstruction    Respiratory and Thorax: No wheezing, dyspnea, cough, hemoptysis, or pleuritic chest pPain     Cardiovascular: No chest pain, palpitations, dyspnea on exertion, orthopnea, paroxysmal nocturnal dyspnea,   peripheral edema, or claudication    Gastrointestinal: No nausea, vomiting, diarrhea, constipation, change in bowel habits, flatulence, abdominal pain, or melena    Genitourinary/ Pelvis: No hematuria, renal colic, or flank pain.  No urine discoloration, incontinence, dysuria, or urinary hesitancy. Normal urinary frequency. No nocturia, abnormal vaginal bleeding, vaginal discharge, spotting, pelvic pain, or vaginal leakage    Musculoskeletal: No arthralgia, arthritis, joint swelling, muscle cramping, muscle weakness, neck pain, arm pain, or leg pain    Neurological: No transient paralysis, weakness, paresthesias, or seizures. No syncope, tremors, vertigo, loss of sensation, difficulty walking, loss of consciousness, hemiparesis, confusion, or facial palsy    Psychiatric: No suicidal ideation, depression, anxiety, insomnia, memory loss, paranoia, mood swings, agitation, hallucinations, or hyperactivity    Hematology: No gum bleeding, nose bleeding, or skin lumps    Lymphatic: No enlarged or tender lymph nodes. No extremity swelling    Endocrine: No heat or cold intolerance    Immunologic: No recurrent or persistent infections General: No fever, chills,   Skin: No rashes, itching, or dryness.   Breast: Normal   Ophthalmologic: No diplopia,   ENMT Symptoms: No hearing difficulty, ear pain, tinnitus, or vertigo. No sinus symptoms, nasal congestion, nasal   discharge, or nasal obstruction    Respiratory and Thorax: Pt c/o of residual   dry cough from URI 3 weeks ago - followed by OB   Cardiovascular: No chest pain, palpitations, dyspnea on exertion,     Gastrointestinal: No nausea, vomiting,   Genitourinary/ Pelvis: No hematuria, renal colic, or flank pain.    Musculoskeletal: No arthralgia, arthritis,   Neurological: No transient paralysis, weakness, paresthesias, or seizures.     Psychiatric: No suicidal ideation, depression, anxiety,   Hematology: No gum bleeding, nose bleeding, or skin lumps    Lymphatic: No enlarged or tender lymph nodes. No extremity swelling    Endocrine: No heat or cold intolerance    Immunologic: No recurrent or persistent infections

## 2022-12-06 NOTE — OB PST NOTE - PROBLEM SELECTOR PLAN 1
Pt scheduled for surgery and preop instructions including instructions  for Chlorhexidine use in showering on the day of surgery, given verbally and with use of  written materials, and patient confirming understanding of such instructions using  teach back method. Pain management and anesthesia instructions reviewed   Pt given information for COVID PCR testing sites and told to make appointment 3-5 days preop

## 2022-12-06 NOTE — OB PST NOTE - NSHPPHYSICALEXAM_GEN_ALL_CORE
Constitutional: Well Developed, Well Groomed, Well Nourished, No Distress    Eyes: PERRL, EOMI, conjunctiva clear    Ears: Normal    Mouth & Gums: Normal, moist    Pharynx: No tenderness, discharge, or peritonsillar abscess    Tonsils: No Redness, discharge, tenderness, or swelling    Neck: Supple, no JVD, normal thyroid glands, no carotid bruits, no cervical vertebral or paraspinal tenderness    Breast: Normal shape, no masses, no tenderness, nipples normal, no nipple discharge    Back: Normal shape, ROM intact, strength intact, no vertebral tenderness    Respiratory: Airway patent, breath sounds equal, good air movement, respiration non-labored, clear to auscultation bilateral, no chest wall tenderness, no intercostal retractions, no rales, no wheezes, no rhonchi, no subcutaneous emphysema    Cardiovascular:  Regular rate and rhythm, no rubs or murmur, normal PMI    Gastrointestinal: Soft, non-tender, non distention, no masses palpable, bowel sound normal, no bruit, no rebound tenderness    Extremities: No clubbing, cyanosis, or pedal edema    Vascular:  Carotid Pulse normal , Radial Pulse normal, Femoral Pulse normal, DP pulse normal, PT pulse normal    Neurological: alert & oriented x 3, sensation intact, deep reflexes intact, cranial nerve intact, normal strength    Skin: warm and dry, normal color    Lymph Nodes: normal posterior cervical lymph node, normal anterior cervical lymph node, normal supraclavicular lymph node, normal axillary lymph node, normal inguinal lymph node, normal femoral lymph node    Musculoskeletal: ROM intact, no joint swelling, warmth, or calf tenderness. Normal strength    Psychiatric: normal affect, normal behavior Constitutional: Well Developed, Well Groomed, Well Nourished, No Distress    Eyes: PERRL, EOMI, conjunctiva clear    Ears: Normal    Mouth & Gums: Normal, moist    Neck: Supple,   Breast: Normal shape,   Back: Normal shape, ROM intact, strength intact,   Respiratory: Airway patent, breath sounds equal, good air movement, respiration non-labored, clear to auscultation bilateral,     Cardiovascular:  Regular rate and rhythm,   Gastrointestinal:  abdomen   Extremities: No clubbing, cyanosis, or pedal edema    Vascular: , Radial Pulse normal,   Neurological: alert & oriented x 3,   Skin: warm and dry, normal color    Lymph Nodes: normal posterior cervical lymph node, normal anterior cervical lymph node, normal supraclavicular lymph node,     Musculoskeletal: ROM intact,   Psychiatric: normal affect, normal behavior

## 2022-12-06 NOTE — OB PST NOTE - NSTRANFUSIONOBJECTION_GEN_ALL_CORE_SIUH
Patient has no objection to blood transfusions.

## 2022-12-07 ENCOUNTER — OUTPATIENT (OUTPATIENT)
Dept: INPATIENT UNIT | Facility: HOSPITAL | Age: 27
LOS: 1 days | Discharge: HOME | End: 2022-12-07
Payer: COMMERCIAL

## 2022-12-07 VITALS
SYSTOLIC BLOOD PRESSURE: 117 MMHG | RESPIRATION RATE: 16 BRPM | DIASTOLIC BLOOD PRESSURE: 50 MMHG | TEMPERATURE: 99 F | HEART RATE: 102 BPM

## 2022-12-07 VITALS — HEART RATE: 91 BPM | DIASTOLIC BLOOD PRESSURE: 58 MMHG | SYSTOLIC BLOOD PRESSURE: 109 MMHG

## 2022-12-07 DIAGNOSIS — Z3A.00 WEEKS OF GESTATION OF PREGNANCY NOT SPECIFIED: ICD-10-CM

## 2022-12-07 DIAGNOSIS — O26.899 OTHER SPECIFIED PREGNANCY RELATED CONDITIONS, UNSPECIFIED TRIMESTER: ICD-10-CM

## 2022-12-07 DIAGNOSIS — Z98.891 HISTORY OF UTERINE SCAR FROM PREVIOUS SURGERY: Chronic | ICD-10-CM

## 2022-12-07 DIAGNOSIS — Z98.89 OTHER SPECIFIED POSTPROCEDURAL STATES: Chronic | ICD-10-CM

## 2022-12-07 DIAGNOSIS — Z98.890 OTHER SPECIFIED POSTPROCEDURAL STATES: Chronic | ICD-10-CM

## 2022-12-07 DIAGNOSIS — Z90.49 ACQUIRED ABSENCE OF OTHER SPECIFIED PARTS OF DIGESTIVE TRACT: Chronic | ICD-10-CM

## 2022-12-07 LAB
APPEARANCE UR: CLEAR — SIGNIFICANT CHANGE UP
BILIRUB UR-MCNC: NEGATIVE — SIGNIFICANT CHANGE UP
COLOR SPEC: YELLOW — SIGNIFICANT CHANGE UP
DIFF PNL FLD: NEGATIVE — SIGNIFICANT CHANGE UP
GLUCOSE UR QL: NEGATIVE — SIGNIFICANT CHANGE UP
KETONES UR-MCNC: ABNORMAL
LEUKOCYTE ESTERASE UR-ACNC: NEGATIVE — SIGNIFICANT CHANGE UP
NITRITE UR-MCNC: NEGATIVE — SIGNIFICANT CHANGE UP
PH UR: 6.5 — SIGNIFICANT CHANGE UP (ref 5–8)
PROT UR-MCNC: ABNORMAL
SP GR SPEC: 1.01 — SIGNIFICANT CHANGE UP (ref 1.01–1.05)
UROBILINOGEN FLD QL: SIGNIFICANT CHANGE UP

## 2022-12-07 PROCEDURE — 99212 OFFICE O/P EST SF 10 MIN: CPT

## 2022-12-07 PROCEDURE — 76818 FETAL BIOPHYS PROFILE W/NST: CPT | Mod: 26

## 2022-12-07 PROCEDURE — 99202 OFFICE O/P NEW SF 15 MIN: CPT | Mod: 25

## 2022-12-07 RX ORDER — ACETAMINOPHEN 500 MG
1000 TABLET ORAL ONCE
Refills: 0 | Status: COMPLETED | OUTPATIENT
Start: 2022-12-07 | End: 2022-12-07

## 2022-12-07 RX ADMIN — Medication 1000 MILLIGRAM(S): at 15:42

## 2022-12-07 NOTE — OB PROVIDER TRIAGE NOTE - LABOR: CERVICAL CONSISTENCY
firm 65M Pmhx pancreatic cancer (dx'ed June 2020), COPD, presenting from nursing home with hypotension, hyponatremia and SOB, likely 2/2 ascites from advanced pancreatic cancer, currently hemodynamically stable and satting high 90's on 3L, being managed with supportive care until further workup + history can be obtained.

## 2022-12-07 NOTE — PROGRESS NOTE ADULT - SUBJECTIVE AND OBJECTIVE BOX
MFM Fellow Consult Note    HPI: 26yo  at 38w0d presenting with intermittent cramping/ctx - MFM consulted to rule out labor. Pt has been observed over the last two hours in triage without cervical change. Fetal status reassuring. History sig for 2 prior c-sections last 2 years ago. No incisional pain. +FM denies lof.       Histories  OB  3/28/2015 c/s at 4cm fetal distress male 7#0  10/22/2020 repeat c/s @36.6wk oligo 5#6  GYN: none  Medical: anemia  Meds: none  Surg:  x2      Physical Exam  ICU Vital Signs Last 24 Hrs  T(C): 37.1 (07 Dec 2022 12:28), Max: 37.1 (07 Dec 2022 11:28)  T(F): 98.78 (07 Dec 2022 12:28), Max: 98.8 (07 Dec 2022 11:28)  HR: 91 (07 Dec 2022 14:54) (88 - 102)  BP: 109/58 (07 Dec 2022 14:54) (93/48 - 117/62)  BP(mean): --  ABP: --  ABP(mean): --  RR: 17 (07 Dec 2022 12:28) (16 - 17)  SpO2: 98% (06 Dec 2022 18:44) (98% - 98%)  Gen: well appearing, mild discomfort during ctx

## 2022-12-07 NOTE — OB PROVIDER TRIAGE NOTE - NSHPPHYSICALEXAM_GEN_ALL_CORE
Assessment reveals VSS, abdomen soft, Nt, gravid.   VE 0/50/-3    Tylenol 1000mg PO ordered Assessment reveals VSS, abdomen soft, Nt, gravid.   VE 0/50/-3  Cat 1 FHT, irritability and irregular ctx noted on toco  Patient appears calm and comfortable.   Tylenol 1000mg PO ordered    To re-evaluate in 2 hours    1440: Cat 1 FHT, ctx irregular with irritability  VE - exam unchanged  Patient reports feeling increase in contractions.   Patient appears comfortable  BPP 8/8, VIKI 9.5, vtx, anterior placenta  D/W Dr. Pompa, for MFM  UA and culture sent as per MD Dr. Nelson MFM fellow in to evaluate patient  Cleared for dc with labor precautions. Assessment reveals VSS, abdomen soft, Nt, gravid.   VE 0/50/-3  Cat 1 FHT, irritability and irregular ctx noted on toco  Patient appears calm and comfortable.   Tylenol 1000mg PO ordered    To re-evaluate in 2 hours    1440: Cat 1 FHT, ctx irregular with irritability  VE - exam unchanged  Patient reports feeling increase in contractions.   Patient appears comfortable  BPP 8/8, VIKI 9.5, vtx, anterior placenta  D/W Dr. Pompa, for MFM  UA and culture sent as per MD Dr. Nelson M fellow in to evaluate patient  Cleared for dc with labor precautions.    Urinalysis Basic - ( 07 Dec 2022 15:12 )    Color: Yellow / Appearance: Clear / S.015 / pH: x  Gluc: x / Ketone: Moderate  / Bili: Negative / Urobili: <2 mg/dL   Blood: x / Protein: Trace / Nitrite: Negative   Leuk Esterase: Negative / RBC: x / WBC x   Sq Epi: x / Non Sq Epi: x / Bacteria: x

## 2022-12-07 NOTE — OB PROVIDER TRIAGE NOTE - NS_OBGYNHISTORY_OBGYN_ALL_OB_FT
3/28/2015 c/s at 4cm fetal distress male 7#0  10/22/2020 repeat c/s @36.6wk oligo 5#6    AP course uncomplicated  For Repeat  12/15

## 2022-12-07 NOTE — OB PROVIDER TRIAGE NOTE - NSOBPROVIDERNOTE_OBGYN_ALL_OB_FT
Plan D/W Dr. Pompa, no evidence of labor at this time.  Patient dc'd home. Plan D/W Dr. Pompa, no evidence of labor at this time.  Patient dc'd home with labor precautions  Follow up as scheduled  Return if symptoms worsen.

## 2022-12-07 NOTE — OB PROVIDER TRIAGE NOTE - HISTORY OF PRESENT ILLNESS
26yo  @ 38.0 presents with c/o period like cramping since 0830 this morning 6/10 pain. Also reports HA. Patient took 650mg of Tylenol this am and it was relieved but now back.  Denies LOF, VB and reports GFM. Denies elevated BP's this pregnancy, visual changes, epigastric pain. H/O 2 previous c-sections. Scheduled for repeat 12/15.   Fully vaccinated for covid-19.    H/O Anemia

## 2022-12-08 LAB
CULTURE RESULTS: SIGNIFICANT CHANGE UP
SPECIMEN SOURCE: SIGNIFICANT CHANGE UP

## 2022-12-09 ENCOUNTER — ASOB RESULT (OUTPATIENT)
Age: 27
End: 2022-12-09

## 2022-12-09 ENCOUNTER — APPOINTMENT (OUTPATIENT)
Dept: ANTEPARTUM | Facility: CLINIC | Age: 27
End: 2022-12-09

## 2022-12-09 PROCEDURE — 76816 OB US FOLLOW-UP PER FETUS: CPT

## 2022-12-09 PROCEDURE — 76819 FETAL BIOPHYS PROFIL W/O NST: CPT | Mod: 59

## 2022-12-14 ENCOUNTER — TRANSCRIPTION ENCOUNTER (OUTPATIENT)
Age: 27
End: 2022-12-14

## 2022-12-14 RX ORDER — FAMOTIDINE 10 MG/ML
20 INJECTION INTRAVENOUS ONCE
Refills: 0 | Status: COMPLETED | OUTPATIENT
Start: 2022-12-15 | End: 2022-12-15

## 2022-12-14 RX ORDER — SODIUM CHLORIDE 9 MG/ML
1000 INJECTION, SOLUTION INTRAVENOUS ONCE
Refills: 0 | Status: COMPLETED | OUTPATIENT
Start: 2022-12-15 | End: 2022-12-15

## 2022-12-14 RX ORDER — CITRIC ACID/SODIUM CITRATE 300-500 MG
30 SOLUTION, ORAL ORAL ONCE
Refills: 0 | Status: COMPLETED | OUTPATIENT
Start: 2022-12-15 | End: 2022-12-15

## 2022-12-15 ENCOUNTER — INPATIENT (INPATIENT)
Facility: HOSPITAL | Age: 27
LOS: 1 days | Discharge: HOME | End: 2022-12-17
Attending: OBSTETRICS & GYNECOLOGY | Admitting: OBSTETRICS & GYNECOLOGY

## 2022-12-15 VITALS — DIASTOLIC BLOOD PRESSURE: 66 MMHG | HEART RATE: 83 BPM | SYSTOLIC BLOOD PRESSURE: 112 MMHG

## 2022-12-15 DIAGNOSIS — Z98.891 HISTORY OF UTERINE SCAR FROM PREVIOUS SURGERY: Chronic | ICD-10-CM

## 2022-12-15 DIAGNOSIS — O34.29 MATERNAL CARE DUE TO UTERINE SCAR FROM OTHER PREVIOUS SURGERY: ICD-10-CM

## 2022-12-15 LAB
BASOPHILS # BLD AUTO: 0.03 K/UL — SIGNIFICANT CHANGE UP (ref 0–0.2)
BASOPHILS NFR BLD AUTO: 0.3 % — SIGNIFICANT CHANGE UP (ref 0–2)
COVID-19 SPIKE DOMAIN AB INTERP: POSITIVE
COVID-19 SPIKE DOMAIN ANTIBODY RESULT: >250 U/ML — HIGH
EOSINOPHIL # BLD AUTO: 0.08 K/UL — SIGNIFICANT CHANGE UP (ref 0–0.5)
EOSINOPHIL NFR BLD AUTO: 0.8 % — SIGNIFICANT CHANGE UP (ref 0–6)
HCT VFR BLD CALC: 29 % — LOW (ref 34.5–45)
HGB BLD-MCNC: 8.8 G/DL — LOW (ref 11.5–15.5)
IANC: 7.28 K/UL — SIGNIFICANT CHANGE UP (ref 1.8–7.4)
IMM GRANULOCYTES NFR BLD AUTO: 3.6 % — HIGH (ref 0–0.9)
LYMPHOCYTES # BLD AUTO: 1.56 K/UL — SIGNIFICANT CHANGE UP (ref 1–3.3)
LYMPHOCYTES # BLD AUTO: 15 % — SIGNIFICANT CHANGE UP (ref 13–44)
MCHC RBC-ENTMCNC: 21.3 PG — LOW (ref 27–34)
MCHC RBC-ENTMCNC: 30.3 GM/DL — LOW (ref 32–36)
MCV RBC AUTO: 70 FL — LOW (ref 80–100)
MONOCYTES # BLD AUTO: 1.09 K/UL — HIGH (ref 0–0.9)
MONOCYTES NFR BLD AUTO: 10.5 % — SIGNIFICANT CHANGE UP (ref 2–14)
NEUTROPHILS # BLD AUTO: 7.28 K/UL — SIGNIFICANT CHANGE UP (ref 1.8–7.4)
NEUTROPHILS NFR BLD AUTO: 69.8 % — SIGNIFICANT CHANGE UP (ref 43–77)
NRBC # BLD: 0 /100 WBCS — SIGNIFICANT CHANGE UP (ref 0–0)
NRBC # FLD: 0 K/UL — SIGNIFICANT CHANGE UP (ref 0–0)
PLATELET # BLD AUTO: 419 K/UL — HIGH (ref 150–400)
RBC # BLD: 4.14 M/UL — SIGNIFICANT CHANGE UP (ref 3.8–5.2)
RBC # FLD: 16.8 % — HIGH (ref 10.3–14.5)
RH IG SCN BLD-IMP: POSITIVE — SIGNIFICANT CHANGE UP
SARS-COV-2 IGG+IGM SERPL QL IA: >250 U/ML — HIGH
SARS-COV-2 IGG+IGM SERPL QL IA: POSITIVE
WBC # BLD: 10.41 K/UL — SIGNIFICANT CHANGE UP (ref 3.8–10.5)
WBC # FLD AUTO: 10.41 K/UL — SIGNIFICANT CHANGE UP (ref 3.8–10.5)

## 2022-12-15 DEVICE — SURGICEL SNOW 2 X 4": Type: IMPLANTABLE DEVICE | Status: FUNCTIONAL

## 2022-12-15 RX ORDER — OXYCODONE HYDROCHLORIDE 5 MG/1
5 TABLET ORAL ONCE
Refills: 0 | Status: DISCONTINUED | OUTPATIENT
Start: 2022-12-15 | End: 2022-12-17

## 2022-12-15 RX ORDER — DIPHENHYDRAMINE HCL 50 MG
25 CAPSULE ORAL EVERY 6 HOURS
Refills: 0 | Status: DISCONTINUED | OUTPATIENT
Start: 2022-12-15 | End: 2022-12-17

## 2022-12-15 RX ORDER — MAGNESIUM HYDROXIDE 400 MG/1
30 TABLET, CHEWABLE ORAL
Refills: 0 | Status: DISCONTINUED | OUTPATIENT
Start: 2022-12-15 | End: 2022-12-17

## 2022-12-15 RX ORDER — HEPARIN SODIUM 5000 [USP'U]/ML
5000 INJECTION INTRAVENOUS; SUBCUTANEOUS EVERY 12 HOURS
Refills: 0 | Status: DISCONTINUED | OUTPATIENT
Start: 2022-12-15 | End: 2022-12-16

## 2022-12-15 RX ORDER — OXYCODONE HYDROCHLORIDE 5 MG/1
5 TABLET ORAL
Refills: 0 | Status: DISCONTINUED | OUTPATIENT
Start: 2022-12-15 | End: 2022-12-17

## 2022-12-15 RX ORDER — ACETAMINOPHEN 500 MG
975 TABLET ORAL
Refills: 0 | Status: DISCONTINUED | OUTPATIENT
Start: 2022-12-15 | End: 2022-12-17

## 2022-12-15 RX ORDER — SIMETHICONE 80 MG/1
80 TABLET, CHEWABLE ORAL EVERY 4 HOURS
Refills: 0 | Status: DISCONTINUED | OUTPATIENT
Start: 2022-12-15 | End: 2022-12-17

## 2022-12-15 RX ORDER — INFLUENZA VIRUS VACCINE 15; 15; 15; 15 UG/.5ML; UG/.5ML; UG/.5ML; UG/.5ML
0.5 SUSPENSION INTRAMUSCULAR ONCE
Refills: 0 | Status: DISCONTINUED | OUTPATIENT
Start: 2022-12-15 | End: 2022-12-17

## 2022-12-15 RX ORDER — TETANUS TOXOID, REDUCED DIPHTHERIA TOXOID AND ACELLULAR PERTUSSIS VACCINE, ADSORBED 5; 2.5; 8; 8; 2.5 [IU]/.5ML; [IU]/.5ML; UG/.5ML; UG/.5ML; UG/.5ML
0.5 SUSPENSION INTRAMUSCULAR ONCE
Refills: 0 | Status: DISCONTINUED | OUTPATIENT
Start: 2022-12-15 | End: 2022-12-17

## 2022-12-15 RX ORDER — SODIUM CHLORIDE 9 MG/ML
1000 INJECTION, SOLUTION INTRAVENOUS
Refills: 0 | Status: DISCONTINUED | OUTPATIENT
Start: 2022-12-15 | End: 2022-12-17

## 2022-12-15 RX ORDER — KETOROLAC TROMETHAMINE 30 MG/ML
30 SYRINGE (ML) INJECTION EVERY 6 HOURS
Refills: 0 | Status: DISCONTINUED | OUTPATIENT
Start: 2022-12-15 | End: 2022-12-16

## 2022-12-15 RX ORDER — ACETAMINOPHEN 500 MG
1000 TABLET ORAL ONCE
Refills: 0 | Status: COMPLETED | OUTPATIENT
Start: 2022-12-15 | End: 2022-12-15

## 2022-12-15 RX ORDER — OXYTOCIN 10 UNIT/ML
333.33 VIAL (ML) INJECTION
Qty: 20 | Refills: 0 | Status: DISCONTINUED | OUTPATIENT
Start: 2022-12-15 | End: 2022-12-17

## 2022-12-15 RX ORDER — LANOLIN
1 OINTMENT (GRAM) TOPICAL EVERY 6 HOURS
Refills: 0 | Status: DISCONTINUED | OUTPATIENT
Start: 2022-12-15 | End: 2022-12-17

## 2022-12-15 RX ORDER — IBUPROFEN 200 MG
600 TABLET ORAL EVERY 6 HOURS
Refills: 0 | Status: COMPLETED | OUTPATIENT
Start: 2022-12-15 | End: 2023-11-13

## 2022-12-15 RX ADMIN — Medication 1000 MILLIGRAM(S): at 22:45

## 2022-12-15 RX ADMIN — Medication 400 MILLIGRAM(S): at 21:29

## 2022-12-15 RX ADMIN — Medication 30 MILLILITER(S): at 15:29

## 2022-12-15 RX ADMIN — FAMOTIDINE 20 MILLIGRAM(S): 10 INJECTION INTRAVENOUS at 15:29

## 2022-12-15 RX ADMIN — SODIUM CHLORIDE 2000 MILLILITER(S): 9 INJECTION, SOLUTION INTRAVENOUS at 15:29

## 2022-12-15 NOTE — OB RN DELIVERY SUMMARY - NSSELHIDDEN_OBGYN_ALL_OB_FT
[[email or web address]_OB_FT:[id number]],[NS_DeliveryAssist1_OBGYN_ALL_OB_FT:[id number]],[NS_DeliveryRN_OBGYN_ALL_OB_FT:[id number]=]

## 2022-12-15 NOTE — OB NEONATOLOGY/PEDIATRICIAN DELIVERY SUMMARY - NSPEDSNEONOTESA_OBGYN_ALL_OB_FT
Peds called to OR for nonreassuring FHR post delivery to scheduled repeat C/S. 39+1 wk male born via CS to a 29 y/o  mother. Maternal hx significant for anemia. Maternal labs include Blood Type O+ , HIV - , RPR NR , Rubella I , Hep B - , GBS- , COVID-. SROM at time of delivery on 12/15 with clear fluids. Baby emerged poor tone, color, gasping, HR<60. Was suctioned, and stimulated with APGARS of 3/8/8 . Resuscitation included suction and significant stimulation. Pulse ox showed HR >100 within seconds of stimulation and oxygen saturation >90% on RA at 5MOL. Did not require CPAP, compressions, or other forms of resuscitation besides suction. Mom plans to initiate breastfeeding, consents Hep B vaccine and consents circ. EOS 0.02.    Physical Exam:  Gen: no acute distress, +grimace  HEENT:  anterior fontanel open soft and flat, nondysmorphic facies, no cleft lip/palate, ears normal set, no ear pits or tags, nares clinically patent  Resp: Normal respiratory effort without grunting or retractions, good air entry b/l, clear to auscultation bilaterally  Cardio: Present S1/S2, regular rate and rhythm, no murmurs  Abd: soft, non tender, non distended, umbilical cord with 3 vessels  Neuro: +palmar and plantar grasp, +suck, +mario, normal tone  Extremities: negative krishna and ortolani maneuvers, moving all extremities, no clavicular crepitus or stepoff  Skin: pink, warm  Genitals: Normal male anatomy, testicles palpable in scrotum b/l, Polo 1, anus patent

## 2022-12-15 NOTE — OB NEONATOLOGY/PEDIATRICIAN DELIVERY SUMMARY - BABY A: APGAR 1 MIN RESP RATE, DELIVERY
"  Problem: Plan of Care - These are the overarching goals to be used throughout the patient stay.    Goal: Patient-Specific Goal (Individualized)  Description: You can add care plan individualizations to a care plan. Examples of Individualization might be:  \"Parent requests to be called daily at 9am for status\", \"I have a hard time hearing out of my right ear\", or \"Do not touch me to wake me up as it startles me\".  Outcome: Ongoing, Progressing     Problem: Plan of Care - These are the overarching goals to be used throughout the patient stay.    Goal: Plan of Care Review/Shift Note  Description: The Plan of Care Review/Shift note should be completed every shift.  The Outcome Evaluation is a brief statement about your assessment that the patient is improving, declining, or no change.  This information will be displayed automatically on your shift note.  Outcome: Ongoing, Progressing     Problem: Fluid Volume Deficit  Goal: Fluid Balance  Outcome: Ongoing, Progressing   Goal Outcome Evaluation:      Patient alert and oriented. Denies pain. Denies dizziness. Receiving 1 unit PRBC for Hgb 6.9. Off floor to IR for procedure/PICC line placement. Spouse has been here.  Ostomy pouch dressing over right chest wound containing bloody drainage.                " (1) weak, irregular

## 2022-12-15 NOTE — OB RN PATIENT PROFILE - FALL HARM RISK - UNIVERSAL INTERVENTIONS
Bed in lowest position, wheels locked, appropriate side rails in place/Call bell, personal items and telephone in reach/Instruct patient to call for assistance before getting out of bed or chair/Non-slip footwear when patient is out of bed/[place] to call system/Physically safe environment - no spills, clutter or unnecessary equipment/Purposeful Proactive Rounding/Room/bathroom lighting operational, light cord in reach

## 2022-12-15 NOTE — OB PROVIDER DELIVERY SUMMARY - NSSELHIDDEN_OBGYN_ALL_OB_FT
[[email or web address]_OB_FT:[email or web address]],[NS_DeliveryAssist1_OBGYN_ALL_OB_FT:[id number]],[NS_DeliveryRN_OBGYN_ALL_OB_FT:[id number]=]

## 2022-12-15 NOTE — OB PROVIDER H&P - NSHPPHYSICALEXAM_GEN_ALL_CORE
VS:   Vital Signs Last 24 Hrs  T(C): 36.6 (15 Dec 2022 14:34), Max: 36.6 (15 Dec 2022 14:18)  T(F): 97.9 (15 Dec 2022 14:34), Max: 97.9 (15 Dec 2022 14:34)  HR: 83 (15 Dec 2022 14:34) (83 - 83)  BP: 112/66 (15 Dec 2022 14:34) (112/66 - 112/66)  BP(mean): --  RR: 16 (15 Dec 2022 14:34) (16 - 16)  SpO2: --    GA: well appearing, no acute distress   Cards: RRR  Pulm: CTAB  EFH: 140s, moderate variability  VE: deferred  TAUS: vertex presentation, fundal placenta VS:   Vital Signs Last 24 Hrs  T(C): 36.6 (15 Dec 2022 14:34), Max: 36.6 (15 Dec 2022 14:18)  T(F): 97.9 (15 Dec 2022 14:34), Max: 97.9 (15 Dec 2022 14:34)  HR: 83 (15 Dec 2022 14:34) (83 - 83)  BP: 112/66 (15 Dec 2022 14:34) (112/66 - 112/66)  BP(mean): --  RR: 16 (15 Dec 2022 14:34) (16 - 16)  SpO2: --      Bedside sono: vertex presentation, fundal placenta

## 2022-12-15 NOTE — OB PROVIDER H&P - ASSESSMENT
A&P:   Labor: admit for scheduled repeat  with Dr. Conklin  -routine labs  -EFM/toco  -NPO, IV hydration  -Pepcid/reglan/bicitra  Fetal: cat 1 tracing, fetal status reassuring  GBS: neg      Discussed with  Hu Thorne PGY-1     A&P: 28yo  @ 39.1wga admitted for scheduled repeat  with Dr. Conklin  -routine labs  -NPO, IV hydration  -Pepcid/reglan/bicitra  Fetal: NST prior to cs    Hu Thorne  PGY-1, Obstetrics & Gynecology     plan per schedule

## 2022-12-15 NOTE — OB RN PATIENT PROFILE - AS TEMP SITE
Admission medication history interview status for the 6/10/2020 admission is complete. See Epic admission navigator for allergy information, pharmacy, prior to admission medications and immunization status.     Medication history interview sources:  [facility]    Changes made to PTA medication list (reason)  Added: Bactrim DS 1 tab BID x 7 days (started 6/10/20 AM)  Deleted: Oxycodone, nicotine patch, flonase nasal spray  Changed: Vitamin D 2000 units  To 800 units daily    Additional medication history information (including reliability of information, actions taken by pharmacist):None      Prior to Admission medications    Medication Sig Last Dose Taking? Auth Provider   ADVAIR DISKUS 250-50 MCG/DOSE diskus inhaler Inhale 1 puff into the lungs 2 times daily 6/10/2020 at AM Yes Alina Jennings MD   alendronate (FOSAMAX) 70 MG tablet Take 1 tablet (70 mg) by mouth with 8oz water every 7 days 30 minutes before breakfast and remain upright during this time. Past Week at 6/5/20 Yes Allan Casey MD   aspirin EC 81 MG EC tablet Take 1 tablet (81 mg) by mouth daily 6/10/2020 at AM Yes Daria Mancilla MD   atorvastatin (LIPITOR) 40 MG tablet TAKE 1 TAB BY MOUTH ONCE DAILY 6/9/2020 at PM Yes Allan Casey MD   brimonidine (ALPHAGAN) 0.2 % ophthalmic solution Place 1 drop into the right eye 2 times daily 6/10/2020 at AM Yes Marely Robin MD   capsaicin-menthol-methyl sal 0.025-1-12 % external cream Apply 1 Application topically daily as needed (topical pain) Past Month at Unknown time Yes Allan Casey MD   diclofenac (VOLTAREN) 1 % GEL topical gel Apply 2 g topically 4 times daily to hands 6/10/2020 at AM (1 of 4) Yes Allan Casey MD   dorzolamide (TRUSOPT) 2 % ophthalmic solution Place 1 drop into the right eye 2 times daily 6/10/2020 at AM Yes Marely Robin MD   escitalopram (LEXAPRO) 10 MG tablet TAKE 1 TAB BY MOUTH ONCE DAILY 6/10/2020 at AM Yes Allan Casey  MD   ferrous sulfate (IRON) 325 (65 FE) MG tablet Take 1 tablet (325 mg) by mouth 2 times daily With meals  Patient taking differently: Take 325 mg by mouth Twice daily every other day 6/10/2020 at AM Yes Allan Casey MD   levETIRAcetam (KEPPRA) 500 MG tablet TAKE 1 TAB BY MOUTH TWICE A DAY 6/10/2020 at AM Yes Allan Casey MD   Lidocaine (LIDOCARE) 4 % Patch Place 1 patch onto the skin every 24 hours To prevent lidocaine toxicity, patient should be patch free for 12 hrs daily. 6/10/2020 at AM Yes Allan Casey MD   lisinopril (PRINIVIL/ZESTRIL) 20 MG tablet TAKE 1 TAB BY MOUTH ONCE DAILY 6/10/2020 at AM Yes Allan Casey MD   loratadine (CLARITIN) 10 MG tablet Take 1 tablet (10 mg) by mouth daily 6/10/2020 at AM Yes Rosangela Hobson APRN CNP   metoprolol succinate ER (TOPROL-XL) 50 MG 24 hr tablet TAKE 1 TAB BY MOUTH ONCE DAILY 6/10/2020 at AM Yes Allan Casey MD   mupirocin (BACTROBAN) 2 % external ointment Apply topically daily Apply to Nares 6/10/2020 at AM Yes Unknown, Entered By History   pantoprazole (PROTONIX) 40 MG EC tablet TAKE 1 TAB BY MOUTH ONCE DAILY 6/10/2020 at AM Yes Allan Casey MD   phenytoin (DILANTIN) 100 MG capsule TAKE 2 CAPS (200MG) BY MOUTH TWICE A DAY 6/10/2020 at AM Yes Allan Casey MD   polyethylene glycol (MIRALAX/GLYCOLAX) Packet Take 17 g by mouth daily Dissolved in water or juice  6/10/2020 at AM Yes Unknown, Entered By History   pregabalin (LYRICA) 150 MG capsule Take 150 mg by mouth 3 times daily  6/10/2020 at AM (1 of 3) Yes Reported, Patient   sennosides (SENOKOT) 8.6 MG tablet Take 1 tablet by mouth 2 times daily  6/10/2020 at AM Yes Reported, Patient   solifenacin (VESICARE) 10 MG tablet TAKE 1 TAB BY MOUTH ONCE DAILY 6/10/2020 at AM Yes Allan Casey MD   sulfamethoxazole-trimethoprim (BACTRIM DS) 800-160 MG tablet Take 1 tablet by mouth 2 times daily 6/10/2020 at AM Yes Unknown, Entered By History   umeclidinium (INCRUSE ELLIPTA) 62.5 MCG/INH  oral inhaler Inhale 1 puff into the lungs daily 6/10/2020 at AM Yes Kirk Aviles MD   vitamin D3 (CHOLECALCIFEROL) 10 MCG (400 UNIT) capsule Take 2 capsules by mouth daily 6/10/2020 at AM Yes Unknown, Entered By History   albuterol (PROVENTIL) (2.5 MG/3ML) 0.083% neb solution INHALE 1 VIAL VIA NEBULIZER EVERY 6 HOURS AS NEEDED More than a month at Unknown time  Allan Casey MD   blood glucose monitoring (ONE TOUCH ULTRA 2) meter device kit Use to test blood sugars 3 times daily or as directed.   Allan Casey MD   blood glucose monitoring (ONE TOUCH ULTRA) test strip Use to test blood sugars 3 times daily or as directed.   Allan Casey MD   blood glucose monitoring (ONE TOUCH ULTRASOFT) lancets Use to test blood sugar 3 times daily or as directed.   Allan Casey MD   fluticasone (FLONASE) 50 MCG/ACT nasal spray Spray 1 spray into both nostrils daily Unknown at Unknown time  Allan Casey MD   lactulose (CHRONULAC) 10 GM/15ML solution Take 30 mLs (20 g) by mouth as needed for constipation Unknown at Unknown time  Allan Casey MD   latanoprost (XALATAN) 0.005 % ophthalmic solution Place 1 drop into both eyes At Bedtime 6/9/20 at PM  Marely Robin MD   metFORMIN (GLUCOPHAGE) 500 MG tablet TAKE 1 TAB BY MOUTH IN THE EVENING WITH DINNER 6/9/2020 at PM  Allan Casey MD   nicotine (NICODERM CQ) 14 MG/24HR 24 hr patch Place 1 patch onto the skin every 24 hours Unknown at Unknown time  Kunal Feliz MD   nitroGLYcerin (NITROSTAT) 0.4 MG sublingual tablet DISSOLVE ONE TABLET UNDER TONGUE AS NEEDED FOR CHEST PAIN MAY REPEAT EVERY 5 MINUTES FOR 3 DOSES, IF SYMPTOMS PERSIST CALL 911 Unknown at Unknown time  Tammy Pickens MD   Nutritional Supplements (ENSURE NUTRITION SHAKE) LIQD Take 1 Bottle by mouth 2 times daily With meals   Allan Casey MD   risperiDONE (RISPERDAL) 0.5 MG tablet TAKE 1 TAB BY MOUTH AT BEDTIME 6/9/2020 at PM  Allan Casey MD   traZODone (DESYREL) 150 MG  tablet TAKE 1 TAB BY MOUTH AT BEDTIME 6/9/2020 at PM  Allan Casey MD         Medication history completed by: Howard Barahona [facility] on 6/11/2020 at 11:03 AM

## 2022-12-15 NOTE — OB PROVIDER DELIVERY SUMMARY - NSPROVIDERDELIVERYNOTE_OBGYN_ALL_OB_FT
viable male infant, oblique presentation, weight 3140g, APGARS 3/8  vaccuum assistance for oblique presentation   dense adhesions between uterus and anterior abdominal wall and between omentum and anterior uterine wall.   Adhesions taken down with bovie and tied off using vicryl for hemostasis   mid-transverse incision   grossly normal uterus, b/l tubes and ovaries  hysterotomy closed in 2 layers using caprosyn   fascia closed using vicryl   surgicell powder over hysterotomy     1200/3000/200    Dictation #: viable male infant, oblique presentation, weight 3140g, APGARS 3/8  vaccuum assistance for oblique presentation   dense adhesions between uterus and anterior abdominal wall and between omentum and anterior uterine wall.   Adhesions taken down with bovie and tied off using vicryl for hemostasis   mid-transverse incision   grossly normal uterus, b/l tubes and ovaries  hysterotomy closed in 2 layers using caprosyn   fascia closed using vicryl   surgicell powder over hysterotomy     1200/3000/200    Dictation #: [id number]

## 2022-12-15 NOTE — OB RN INTRAOPERATIVE NOTE - NSSELHIDDEN_OBGYN_ALL_OB_FT
[NS_DeliveryAttending1_OBGYN_ALL_OB_FT:[email or web address]=],[NS_DeliveryAssist1_OBGYN_ALL_OB_FT:[id number]=],[NS_DeliveryRN_OBGYN_ALL_OB_FT:[id number]=]

## 2022-12-16 ENCOUNTER — APPOINTMENT (OUTPATIENT)
Dept: ANTEPARTUM | Facility: CLINIC | Age: 27
End: 2022-12-16

## 2022-12-16 DIAGNOSIS — Z3A.38 38 WEEKS GESTATION OF PREGNANCY: ICD-10-CM

## 2022-12-16 DIAGNOSIS — R51.9 HEADACHE, UNSPECIFIED: ICD-10-CM

## 2022-12-16 DIAGNOSIS — O26.893 OTHER SPECIFIED PREGNANCY RELATED CONDITIONS, THIRD TRIMESTER: ICD-10-CM

## 2022-12-16 DIAGNOSIS — O47.1 FALSE LABOR AT OR AFTER 37 COMPLETED WEEKS OF GESTATION: ICD-10-CM

## 2022-12-16 LAB
BASOPHILS # BLD AUTO: 0.02 K/UL — SIGNIFICANT CHANGE UP (ref 0–0.2)
BASOPHILS NFR BLD AUTO: 0.1 % — SIGNIFICANT CHANGE UP (ref 0–2)
EOSINOPHIL # BLD AUTO: 0.01 K/UL — SIGNIFICANT CHANGE UP (ref 0–0.5)
EOSINOPHIL NFR BLD AUTO: 0.1 % — SIGNIFICANT CHANGE UP (ref 0–6)
HCT VFR BLD CALC: 21.1 % — LOW (ref 34.5–45)
HCT VFR BLD CALC: 22.3 % — LOW (ref 34.5–45)
HCT VFR BLD CALC: 23.7 % — LOW (ref 34.5–45)
HGB BLD-MCNC: 6.6 G/DL — CRITICAL LOW (ref 11.5–15.5)
HGB BLD-MCNC: 6.9 G/DL — CRITICAL LOW (ref 11.5–15.5)
HGB BLD-MCNC: 7.5 G/DL — LOW (ref 11.5–15.5)
IANC: 14.68 K/UL — HIGH (ref 1.8–7.4)
IMM GRANULOCYTES NFR BLD AUTO: 1.1 % — HIGH (ref 0–0.9)
LYMPHOCYTES # BLD AUTO: 1.1 K/UL — SIGNIFICANT CHANGE UP (ref 1–3.3)
LYMPHOCYTES # BLD AUTO: 6.3 % — LOW (ref 13–44)
MCHC RBC-ENTMCNC: 21.6 PG — LOW (ref 27–34)
MCHC RBC-ENTMCNC: 22 PG — LOW (ref 27–34)
MCHC RBC-ENTMCNC: 22.1 PG — LOW (ref 27–34)
MCHC RBC-ENTMCNC: 30.9 GM/DL — LOW (ref 32–36)
MCHC RBC-ENTMCNC: 31.3 GM/DL — LOW (ref 32–36)
MCHC RBC-ENTMCNC: 31.6 GM/DL — LOW (ref 32–36)
MCV RBC AUTO: 69.2 FL — LOW (ref 80–100)
MCV RBC AUTO: 69.7 FL — LOW (ref 80–100)
MCV RBC AUTO: 71 FL — LOW (ref 80–100)
MONOCYTES # BLD AUTO: 1.51 K/UL — HIGH (ref 0–0.9)
MONOCYTES NFR BLD AUTO: 8.6 % — SIGNIFICANT CHANGE UP (ref 2–14)
NEUTROPHILS # BLD AUTO: 14.68 K/UL — HIGH (ref 1.8–7.4)
NEUTROPHILS NFR BLD AUTO: 83.8 % — HIGH (ref 43–77)
NRBC # BLD: 0 /100 WBCS — SIGNIFICANT CHANGE UP (ref 0–0)
NRBC # FLD: 0 K/UL — SIGNIFICANT CHANGE UP (ref 0–0)
PLATELET # BLD AUTO: 324 K/UL — SIGNIFICANT CHANGE UP (ref 150–400)
PLATELET # BLD AUTO: 326 K/UL — SIGNIFICANT CHANGE UP (ref 150–400)
PLATELET # BLD AUTO: 333 K/UL — SIGNIFICANT CHANGE UP (ref 150–400)
RBC # BLD: 3.05 M/UL — LOW (ref 3.8–5.2)
RBC # BLD: 3.14 M/UL — LOW (ref 3.8–5.2)
RBC # BLD: 3.4 M/UL — LOW (ref 3.8–5.2)
RBC # FLD: 16.4 % — HIGH (ref 10.3–14.5)
RBC # FLD: 16.5 % — HIGH (ref 10.3–14.5)
RBC # FLD: 16.9 % — HIGH (ref 10.3–14.5)
T PALLIDUM AB TITR SER: NEGATIVE — SIGNIFICANT CHANGE UP
WBC # BLD: 15.69 K/UL — HIGH (ref 3.8–10.5)
WBC # BLD: 17.07 K/UL — HIGH (ref 3.8–10.5)
WBC # BLD: 17.52 K/UL — HIGH (ref 3.8–10.5)
WBC # FLD AUTO: 15.69 K/UL — HIGH (ref 3.8–10.5)
WBC # FLD AUTO: 17.07 K/UL — HIGH (ref 3.8–10.5)
WBC # FLD AUTO: 17.52 K/UL — HIGH (ref 3.8–10.5)

## 2022-12-16 RX ORDER — HEPARIN SODIUM 5000 [USP'U]/ML
10000 INJECTION INTRAVENOUS; SUBCUTANEOUS EVERY 12 HOURS
Refills: 0 | Status: DISCONTINUED | OUTPATIENT
Start: 2022-12-16 | End: 2022-12-17

## 2022-12-16 RX ORDER — DIPHENHYDRAMINE HCL 50 MG
25 CAPSULE ORAL ONCE
Refills: 0 | Status: COMPLETED | OUTPATIENT
Start: 2022-12-16 | End: 2022-12-16

## 2022-12-16 RX ORDER — ACETAMINOPHEN 500 MG
975 TABLET ORAL ONCE
Refills: 0 | Status: DISCONTINUED | OUTPATIENT
Start: 2022-12-16 | End: 2022-12-16

## 2022-12-16 RX ORDER — HEPARIN SODIUM 5000 [USP'U]/ML
10000 INJECTION INTRAVENOUS; SUBCUTANEOUS EVERY 12 HOURS
Refills: 0 | Status: DISCONTINUED | OUTPATIENT
Start: 2022-12-16 | End: 2022-12-16

## 2022-12-16 RX ORDER — IBUPROFEN 200 MG
600 TABLET ORAL EVERY 6 HOURS
Refills: 0 | Status: DISCONTINUED | OUTPATIENT
Start: 2022-12-16 | End: 2022-12-17

## 2022-12-16 RX ADMIN — Medication 975 MILLIGRAM(S): at 20:45

## 2022-12-16 RX ADMIN — Medication 975 MILLIGRAM(S): at 14:45

## 2022-12-16 RX ADMIN — Medication 30 MILLIGRAM(S): at 00:22

## 2022-12-16 RX ADMIN — Medication 975 MILLIGRAM(S): at 20:15

## 2022-12-16 RX ADMIN — Medication 975 MILLIGRAM(S): at 09:35

## 2022-12-16 RX ADMIN — Medication 975 MILLIGRAM(S): at 10:05

## 2022-12-16 RX ADMIN — Medication 30 MILLIGRAM(S): at 06:05

## 2022-12-16 RX ADMIN — Medication 25 MILLIGRAM(S): at 14:14

## 2022-12-16 RX ADMIN — Medication 30 MILLIGRAM(S): at 00:44

## 2022-12-16 RX ADMIN — HEPARIN SODIUM 10000 UNIT(S): 5000 INJECTION INTRAVENOUS; SUBCUTANEOUS at 01:00

## 2022-12-16 RX ADMIN — Medication 975 MILLIGRAM(S): at 14:14

## 2022-12-16 RX ADMIN — Medication 975 MILLIGRAM(S): at 03:46

## 2022-12-16 RX ADMIN — Medication 30 MILLIGRAM(S): at 13:25

## 2022-12-16 RX ADMIN — HEPARIN SODIUM 10000 UNIT(S): 5000 INJECTION INTRAVENOUS; SUBCUTANEOUS at 12:30

## 2022-12-16 RX ADMIN — Medication 975 MILLIGRAM(S): at 03:00

## 2022-12-16 RX ADMIN — Medication 30 MILLIGRAM(S): at 12:55

## 2022-12-16 RX ADMIN — Medication 30 MILLIGRAM(S): at 06:30

## 2022-12-16 RX ADMIN — Medication 600 MILLIGRAM(S): at 18:30

## 2022-12-16 RX ADMIN — Medication 600 MILLIGRAM(S): at 18:00

## 2022-12-16 NOTE — PROVIDER CONTACT NOTE (OTHER) - ASSESSMENT
Pt denies lightheadedness, dizziness, sob, tachycardia or fatigue. Blood pressure and orthostatics are wnl.

## 2022-12-16 NOTE — CHART NOTE - NSCHARTNOTEFT_GEN_A_CORE
S: Patient evaluated after 12pm lab results. She denies dizziness, lightheadedness, SOB, palpitations, fatigue. She is now accepting the blood transfusion.    Vital Signs Last 24 Hrs  T(C): 36.7 (16 Dec 2022 13:34), Max: 37 (16 Dec 2022 02:20)  T(F): 98 (16 Dec 2022 13:34), Max: 98.6 (16 Dec 2022 02:20)  HR: 83 (16 Dec 2022 13:34) (64 - 86)  BP: 106/63 (16 Dec 2022 13:34) (73/54 - 122/60)  BP(mean): 75 (15 Dec 2022 21:15) (56 - 77)  RR: 17 (16 Dec 2022 13:34) (15 - 25)  SpO2: 100% (16 Dec 2022 13:34) (98% - 100%)    Parameters below as of 16 Dec 2022 13:34  Patient On (Oxygen Delivery Method): room air               6.6    17.07 )-----------( 326      ( 12-16 @ 12:10 )             21.1     A/P: POD1 rMTCS with blood loss anemia. Vital signs stable  - Patient is accepting blood transfusion  - Transfuse 2u pRBC  - 4h post transfusion CBC    Nettie Dobbs, PGY1  d/w Dr. Guerra

## 2022-12-16 NOTE — PROVIDER CONTACT NOTE (CRITICAL VALUE NOTIFICATION) - BACKGROUND
rpt c/s 12/15 @16:33, vacuum assist. Pt with history of anemia, treated with iron pills. Initial H/H  8.8/29, p delivery, CBC repeated @ 2345, drop in H/H. CBC repeated again at 0600, H/H continue to drop

## 2022-12-17 ENCOUNTER — TRANSCRIPTION ENCOUNTER (OUTPATIENT)
Age: 27
End: 2022-12-17

## 2022-12-17 VITALS
TEMPERATURE: 99 F | SYSTOLIC BLOOD PRESSURE: 124 MMHG | HEART RATE: 81 BPM | OXYGEN SATURATION: 99 % | DIASTOLIC BLOOD PRESSURE: 67 MMHG | RESPIRATION RATE: 18 BRPM

## 2022-12-17 LAB
HCT VFR BLD CALC: 25.4 % — LOW (ref 34.5–45)
HGB BLD-MCNC: 8.4 G/DL — LOW (ref 11.5–15.5)
MCHC RBC-ENTMCNC: 23.8 PG — LOW (ref 27–34)
MCHC RBC-ENTMCNC: 33.1 GM/DL — SIGNIFICANT CHANGE UP (ref 32–36)
MCV RBC AUTO: 72 FL — LOW (ref 80–100)
NRBC # BLD: 0 /100 WBCS — SIGNIFICANT CHANGE UP (ref 0–0)
NRBC # FLD: 0 K/UL — SIGNIFICANT CHANGE UP (ref 0–0)
PLATELET # BLD AUTO: 271 K/UL — SIGNIFICANT CHANGE UP (ref 150–400)
RBC # BLD: 3.53 M/UL — LOW (ref 3.8–5.2)
RBC # FLD: 18.2 % — HIGH (ref 10.3–14.5)
WBC # BLD: 15.08 K/UL — HIGH (ref 3.8–10.5)
WBC # FLD AUTO: 15.08 K/UL — HIGH (ref 3.8–10.5)

## 2022-12-17 RX ORDER — ACETAMINOPHEN 500 MG
3 TABLET ORAL
Qty: 120 | Refills: 0
Start: 2022-12-17

## 2022-12-17 RX ORDER — IBUPROFEN 200 MG
1 TABLET ORAL
Qty: 50 | Refills: 0
Start: 2022-12-17

## 2022-12-17 RX ADMIN — Medication 600 MILLIGRAM(S): at 12:00

## 2022-12-17 RX ADMIN — HEPARIN SODIUM 10000 UNIT(S): 5000 INJECTION INTRAVENOUS; SUBCUTANEOUS at 00:41

## 2022-12-17 RX ADMIN — SIMETHICONE 80 MILLIGRAM(S): 80 TABLET, CHEWABLE ORAL at 11:07

## 2022-12-17 RX ADMIN — Medication 600 MILLIGRAM(S): at 17:00

## 2022-12-17 RX ADMIN — Medication 975 MILLIGRAM(S): at 21:10

## 2022-12-17 RX ADMIN — Medication 600 MILLIGRAM(S): at 11:01

## 2022-12-17 RX ADMIN — Medication 975 MILLIGRAM(S): at 20:40

## 2022-12-17 RX ADMIN — Medication 600 MILLIGRAM(S): at 00:40

## 2022-12-17 RX ADMIN — Medication 600 MILLIGRAM(S): at 16:17

## 2022-12-17 RX ADMIN — HEPARIN SODIUM 10000 UNIT(S): 5000 INJECTION INTRAVENOUS; SUBCUTANEOUS at 12:18

## 2022-12-17 RX ADMIN — Medication 600 MILLIGRAM(S): at 01:10

## 2022-12-17 NOTE — DISCHARGE NOTE OB - NSDCQMERRANDS_GEN_ALL_CORE
[FreeTextEntry6] : 4 days of ST pain 9/10, no fever, no body aches, no fatigue, no SA, no N/V/D, decreased po, PNC.  HA pain 3/10 frontal.  [place] 1 mo.  Did not take meds.  Cough wet, no SOB or chest pain, coughing at night. No

## 2022-12-17 NOTE — PROGRESS NOTE ADULT - ASSESSMENT
A/P: 27y POD#1 s/p rMTCS c/b elevated QBL of 1200cc w/ diagnostics notable for acute blood loss anemia. Patient is nevertheless, progressing appropriately post-opo  - Continue regular diet.  - Encourage ambulation  - Continue motrin, tylenol, oxycodone PRN for pain control.    #Acute blood loss anemia  - CBC this AM w/ H/H of 6.9/22.3   - Will order for Fe  - VS and exam at time of evaluation reassuring  - Will discuss w/ on-call attending regarding timing and consideration of repeat CBC    Hu Thorne, PGY-1  Ob/Gyn
A/P: 27y POD#2 s/p rMTCS c/b elevated QBL of 1200cc with labs notable for low H/H, now s/p 2uPRBC with appropriate rise in H/H.  Patient is stable and doing well postoperatively.      #Postpartum  - Continue regular diet.  - Encourage ambulation  - Continue motrin, tylenol, oxycodone PRN for pain control.  - Continue HSQ for DVT ppx    #Acute blood loss anemia  - s/p 2uPRBC  - 4hr post transfusion CBC: 6.6/21.1->2uPRBC->8.4/25.4  - Per attending on call, no need for repeat labs  - Patient denies symptoms of anemia  - Continue oral Fe    MIRTA Casper PGY1

## 2022-12-17 NOTE — DISCHARGE NOTE OB - CARE PROVIDER_API CALL
Karey Conklin)  Obstetrics and Gynecology  [address] [place] [address]  Phone: [phone]  Fax: [phone]  Established Patient  Follow Up Time: 2 weeks

## 2022-12-17 NOTE — DISCHARGE NOTE OB - PATIENT PORTAL LINK FT
You can access the FollowMyHealth Patient Portal offered by [facility] by registering at the following website: [email or web address]. By joining [email or web address]’s FollowMyHealth portal, you will also be able to view your health information using other applications (apps) compatible with our system.

## 2022-12-17 NOTE — DISCHARGE NOTE OB - CARE PLAN
Assessment and plan of treatment:	F/U 1 WEEK   1 Principal Discharge DX:	Delivered by  delivery following previous  delivery  Assessment and plan of treatment:	F/U 1 WEEK  Secondary Diagnosis:	Acute on chronic blood loss anemia  Secondary Diagnosis:	Acute postoperative anemia due to expected blood loss

## 2022-12-17 NOTE — DISCHARGE NOTE OB - NS MD DC FALL RISK RISK
For information on Fall & Injury Prevention, visit: [email or web address] OR  [email or web address] OR  https://www.cdc.gov/steadi/patient.html

## 2022-12-17 NOTE — DISCHARGE NOTE OB - PROVIDER TOKENS
[facility]    [address] [place] [address]    Phone:  [phone]    Fax:  [phone]       Thank You for choosing us for your health care visit. We are glad to serve you and happy to provide you with this summary of your visit. Please help us to ensure we have accurate records. If you find anything that needs to be changed, please let our staff know as soon as possible.          Your Demographic Information     Patient Name Sex     PlTha hassan Male 1995       Ethnic Group Patient Race    Not of  or  Origin White      Your Visit Details     Date & Time Provider Department    2017 3:30 PM Leeanne Soto MD [facility]      Your Vitals Were     BP Pulse Resp Height Weight BMI    108/58 (BP [place]: [facility] [place] Patient Position: Sitting, Cuff Size: Regular) 62 18 5' 11\" (1.803 m) 161 lb 13.1 oz (73.4 kg) 22.57 kg/m2    Smoking Status                   Former Smoker           Medications Prescribed or Re-Ordered Today     None      Your Current Medications Are        Disp Refills Start End    cefUROXime (CEFTIN) 500 MG tablet 20 tablet 0 2017     Sig - Route: Take 1 tablet by mouth 2 times daily. - Oral    Class: Eprescribe    ibuprofen (MOTRIN) 800 MG tablet 30 tablet 0 2017     Sig - Route: Take 1 tablet by mouth 3 times daily as needed for Pain. - Oral    Fluticasone-Salmeterol (ADVAIR DISKUS IN)        Class: Historical Med    Route: Inhalation    valACYclovir (VALTREX) 500 MG tablet 30 tablet 5 10/20/2016     Sig - Route: Take 1 tablet by mouth 2 times daily. - Oral    Class: Eprescribe      Allergies     Penicillins RASH    As child            Patient Instructions     None      
PROVIDER:[TOKEN:[[id number]],FOLLOWUP:[2 weeks],ESTABLISHEDPATIENT:[T]]

## 2022-12-17 NOTE — PROGRESS NOTE ADULT - SUBJECTIVE AND OBJECTIVE BOX
Pain Service Follow-up  Postop Day  1    S/P  C- Section    T(C): 36.7 (12-16-22 @ 13:34), Max: 37 (12-16-22 @ 02:20)  HR: 83 (12-16-22 @ 13:34) (64 - 86)  BP: 106/63 (12-16-22 @ 13:34) (73/54 - 122/60)  RR: 17 (12-16-22 @ 13:34) (15 - 25)  SpO2: 100% (12-16-22 @ 13:34) (98% - 100%)  Wt(kg): --      THERAPY:  Spinal Morphine     Sedation Score:	  [X] Alert	      [  ] Drowsy       [  ] Arousable	[  ] Asleep         [  ] Unresponsive    Side Effects:	  [X] None	      [  ] Nausea       [  ] Pruritus        [  ] Weakness   [  ] Numbness        ASSESSMENT/ PLAN   [ X ] Discontinue         [  ] Continue    [ X ] Documentation and Verification of current medications       Satisfactory Post Anesthetic Course    
Post Op C/S day 2      Pt without complaints    Vital Signs Last 24 Hrs  T(C): 36.7 (17 Dec 2022 02:00), Max: 37.1 (16 Dec 2022 21:58)  T(F): 98.1 (17 Dec 2022 02:00), Max: 98.7 (16 Dec 2022 21:58)  HR: 84 (17 Dec 2022 02:00) (64 - 87)  BP: 109/62 (17 Dec 2022 02:00) (106/63 - 112/66)  BP(mean): --  RR: 18 (17 Dec 2022 02:00) (17 - 18)  SpO2: 100% (17 Dec 2022 02:00) (100% - 100%)    Parameters below as of 17 Dec 2022 02:00  Patient On (Oxygen Delivery Method): room air      MEDICATIONS  (STANDING):  acetaminophen     Tablet .. 975 milliGRAM(s) Oral <User Schedule>  diphtheria/tetanus/pertussis (acellular) Vaccine (Adacel) 0.5 milliLiter(s) IntraMuscular once  heparin   Injectable 61035 Unit(s) SubCutaneous every 12 hours  ibuprofen  Tablet. 600 milliGRAM(s) Oral every 6 hours  influenza   Vaccine 0.5 milliLiter(s) IntraMuscular once  lactated ringers. 1000 milliLiter(s) (125 mL/Hr) IV Continuous <Continuous>  lactated ringers. 1000 milliLiter(s) (75 mL/Hr) IV Continuous <Continuous>  oxytocin Infusion 333.333 milliUNIT(s)/Min (1000 mL/Hr) IV Continuous <Continuous>    MEDICATIONS  (PRN):  diphenhydrAMINE 25 milliGRAM(s) Oral every 6 hours PRN Pruritus  lanolin Ointment 1 Application(s) Topical every 6 hours PRN Sore Nipples  magnesium hydroxide Suspension 30 milliLiter(s) Oral two times a day PRN Constipation  oxyCODONE    IR 5 milliGRAM(s) Oral every 3 hours PRN Moderate to Severe Pain (4-10)  oxyCODONE    IR 5 milliGRAM(s) Oral once PRN Moderate to Severe Pain (4-10)  simethicone 80 milliGRAM(s) Chew every 4 hours PRN Gas        Abdomen soft  fundus firm  Incision clean dry and intact  extremities non tender  lochia wnl                          8.4    15.08 )-----------( 271      ( 17 Dec 2022 02:10 )             25.4     Patient doing well    Routine post operative care
Postpartum Note,  Section  She is a  27y woman who is now post-operative day: 1    Subjective:  The patient feels well.  She is ambulating.   She is tolerating regular diet.  She denies nausea and vomiting.  She is voiding.  Her pain is controlled.  She reports normal postpartum bleeding.    Vital Signs Last 24 Hrs  T(C): 37 (16 Dec 2022 06:24), Max: 37 (16 Dec 2022 02:20)  T(F): 98.6 (16 Dec 2022 06:24), Max: 98.6 (16 Dec 2022 02:20)  HR: 64 (16 Dec 2022 06:24) (64 - 86)  BP: 112/66 (16 Dec 2022 06:24) (73/54 - 122/60)  BP(mean): 75 (15 Dec 2022 21:15) (56 - 77)  RR: 18 (16 Dec 2022 06:24) (15 - 25)  SpO2: 100% (16 Dec 2022 06:24) (98% - 100%)    Parameters below as of 16 Dec 2022 02:20  Patient On (Oxygen Delivery Method): room air        Physical exam:  Gen: NAD  Breast: Soft, nontender, not engorged.  Abdomen: Soft, nontender, no distension , firm uterine fundus at umbilicus.  Incision: Clean, dry, and intact with steri strips  Pelvic: Normal lochia noted  Ext: No calf tenderness    LABS:                        6.9    17.52 )-----------( 333      ( 16 Dec 2022 05:55 )             22.3     ABO Interpretation: O (12-15 @ 14:14)  Rh Interpretation: Positive (12-15 @ 14:14)  Antibody Screen: Negative (12-15 @ 14:14)      Allergies    penicillin (Hives)    Intolerances      MEDICATIONS  (STANDING):  acetaminophen     Tablet .. 975 milliGRAM(s) Oral <User Schedule>  diphtheria/tetanus/pertussis (acellular) Vaccine (Adacel) 0.5 milliLiter(s) IntraMuscular once  heparin   Injectable 17781 Unit(s) SubCutaneous every 12 hours  ibuprofen  Tablet. 600 milliGRAM(s) Oral every 6 hours  influenza   Vaccine 0.5 milliLiter(s) IntraMuscular once  ketorolac   Injectable 30 milliGRAM(s) IV Push every 6 hours  lactated ringers. 1000 milliLiter(s) (125 mL/Hr) IV Continuous <Continuous>  lactated ringers. 1000 milliLiter(s) (75 mL/Hr) IV Continuous <Continuous>  oxytocin Infusion 333.333 milliUNIT(s)/Min (1000 mL/Hr) IV Continuous <Continuous>    MEDICATIONS  (PRN):  diphenhydrAMINE 25 milliGRAM(s) Oral every 6 hours PRN Pruritus  lanolin Ointment 1 Application(s) Topical every 6 hours PRN Sore Nipples  magnesium hydroxide Suspension 30 milliLiter(s) Oral two times a day PRN Constipation  oxyCODONE    IR 5 milliGRAM(s) Oral every 3 hours PRN Moderate to Severe Pain (4-10)  oxyCODONE    IR 5 milliGRAM(s) Oral once PRN Moderate to Severe Pain (4-10)  simethicone 80 milliGRAM(s) Chew every 4 hours PRN Gas        Assessment and Plan  POD #1 s/p  section  Anemia , HB 6.9  plan to transfuse 2 unit PRBC  FU cbc after 4 hr  Doing well.  Encourage ambulation.        
OB Progress Note:  Delivery, POD#1    S: 27y POD#1 s/p rMTCS c/b elevated QBL of 1200cc. Pain controlled. Tolerating a regular diet and passing flatus. Denies n/v, chest pain, or shortness of breath. Does endorse mild light-headedness which she feels has significant improved since after delivery. Voiding spontaneously and ambulating (denies any significant light-headedness/dizziness w/ ambulating)    O:   Vital Signs Last 24 Hrs  T(C): 37 (16 Dec 2022 06:24), Max: 37 (16 Dec 2022 02:20)  T(F): 98.6 (16 Dec 2022 06:24), Max: 98.6 (16 Dec 2022 02:20)  HR: 64 (16 Dec 2022 06:24) (64 - 86)  BP: 112/66 (16 Dec 2022 06:24) (73/54 - 122/60)  BP(mean): 75 (15 Dec 2022 21:15) (56 - 77)  RR: 18 (16 Dec 2022 06:24) (15 - 25)  SpO2: 100% (16 Dec 2022 06:24) (98% - 100%)    Parameters below as of 16 Dec 2022 02:20  Patient On (Oxygen Delivery Method): room air        Labs:  Blood type: O Positive  Rubella IgG: RPR:                           6.9<LL>   17.52<H> >-----------< 333    (  @ 05:55 )             22.3<L>                        7.5<L>   15.69<H> >-----------< 324    (  @ 00:55 )             23.7<L>                        8.8<L>   10.41 >-----------< 419<H>    ( 12-15 @ 14:15 )             29.0<L>      PE:  General: No acute distress. Sitting up in bed breastfeeding   Pulm: Unlabored breathing. No respiratory distress  Abdomen: Soft. Non-tender. Incision c/d/i   Extremities: No calf tenderness bilaterally     
R1 Progress Note    Patient seen and examined at bedside.  Reports feeling well after overnight blood transfusion. Denies lightheadedness/dizziness/palpitations.  No acute complaints, pain well controlled. Patient is ambulating and tolerating regular diet. Passing flatus, voiding spontaneously. Denies CP, SOB, N/V, HA, blurred vision, epigastric pain.    Vital Signs Last 24 Hours  T(C): 37.1 (12-17-22 @ 06:00), Max: 37.1 (12-16-22 @ 21:58)  HR: 75 (12-17-22 @ 06:00) (75 - 87)  BP: 108/82 (12-17-22 @ 06:00) (106/63 - 112/66)  RR: 17 (12-17-22 @ 06:00) (17 - 18)  SpO2: 100% (12-17-22 @ 06:00) (100% - 100%)    I&O's Summary    16 Dec 2022 07:01  -  17 Dec 2022 07:00  --------------------------------------------------------  IN: 0 mL / OUT: 500 mL / NET: -500 mL        Physical Exam:  General: NAD  Abdomen: Soft, non-tender, non-distended, fundus firm  Incision: Pfannenstiel incision CDI  Pelvic: Lochia wnl    Labs:    Blood Type: O Positive  Antibody Screen: Negative  RPR: Negative               8.4    15.08 )-----------( 271      ( 12-17 @ 02:10 )             25.4                6.6    17.07 )-----------( 326      ( 12-16 @ 12:10 )             21.1                6.9    17.52 )-----------( 333      ( 12-16 @ 05:55 )             22.3         MEDICATIONS  (STANDING):  acetaminophen     Tablet .. 975 milliGRAM(s) Oral <User Schedule>  diphtheria/tetanus/pertussis (acellular) Vaccine (Adacel) 0.5 milliLiter(s) IntraMuscular once  heparin   Injectable 90571 Unit(s) SubCutaneous every 12 hours  ibuprofen  Tablet. 600 milliGRAM(s) Oral every 6 hours  influenza   Vaccine 0.5 milliLiter(s) IntraMuscular once  lactated ringers. 1000 milliLiter(s) (125 mL/Hr) IV Continuous <Continuous>  lactated ringers. 1000 milliLiter(s) (75 mL/Hr) IV Continuous <Continuous>  oxytocin Infusion 333.333 milliUNIT(s)/Min (1000 mL/Hr) IV Continuous <Continuous>    MEDICATIONS  (PRN):  diphenhydrAMINE 25 milliGRAM(s) Oral every 6 hours PRN Pruritus  lanolin Ointment 1 Application(s) Topical every 6 hours PRN Sore Nipples  magnesium hydroxide Suspension 30 milliLiter(s) Oral two times a day PRN Constipation  oxyCODONE    IR 5 milliGRAM(s) Oral every 3 hours PRN Moderate to Severe Pain (4-10)  oxyCODONE    IR 5 milliGRAM(s) Oral once PRN Moderate to Severe Pain (4-10)  simethicone 80 milliGRAM(s) Chew every 4 hours PRN Gas

## 2024-01-13 NOTE — OB PROVIDER DELIVERY SUMMARY - NS_ROMTYPE_OBGYN_ALL_OB
[FreeTextEntry1] : Patient here for vaccines. No fevers. Vaccine given with VIS sheet provided. Recommend warm/cold compress for any post vaccine pain, redness or swelling. Pain meds as needed. AROM

## 2024-01-24 RX ORDER — BENZOYL PEROXIDE MICRONIZED 5.8 %
1 TOWELETTE (EA) TOPICAL
Qty: 0 | Refills: 0 | DISCHARGE

## 2024-01-24 RX ORDER — FERROUS SULFATE 325(65) MG
0 TABLET ORAL
Qty: 0 | Refills: 0 | DISCHARGE

## 2024-03-04 NOTE — OB PST NOTE - MALLAMPATI CLASS
"Patient scheduled for Right + Left Heart Cath on 3/13/24 at [facility] with Dr. ELBA Sharpe.      Instructions sent to patient through iTOK.      Patient aware of all general instructions.    Medication holds:   Hydrochlorothiazide - Do not take morning of procedure.    Labs to be done on 3/5/24:  CMP / CBC (fasting 8 hours) (already ordered by CTS office)     Insurance: [facility] Rep     Please obtain auth.     Thank you,  Alesia \"Era\" Luis      " with phonation/Class III - visualization of the soft palate and the base of the uvula

## 2024-03-07 NOTE — ED PROVIDER NOTE - NS ED MD DISPO DISCHARGE
3/7/2024    Faith Bernard  In office visit     Chief Complaint   Patient presents with    Asthma       HPI:   03/07/2024:  Mother passed away in December 2023 - states she had several BREO inhalers around the home. Interested in using BREO until it runs out.   Using Breo once puff once per day and using Albuterol PRN - states hasn't used since approx one month ago.  States one month ago got ill - had to take Prednisone regimen with benefit. Thinks may have taken DOXY regimen at that time as well, unsure.   Has intermittent cough - nonproductive. Taking Tessalon PRN with benefit. Has used Codeine cough syrup in the past with great benefit.         05/22/2023:  Overall doing better since last appointment. Underwent BNP and D-Dimer blood test - both normal. CT Chest obtained revealing mild atelectasis. Underwent PFT revealing mild restrictive process, 19% bronchodilator response to the smaller airways. Six minute walk test revealed no need for supplemental oxygen.  Shortness of breath is intermittent, worse with exertion, consuming large meals, associated with wheezing - worse with certain movements.  Denies current mucous production - cough is intermittent, worse at night time, overall improved in severity in comparison to prior. Prior asthma flare with severe cough - patient states she ruined her prior bladder suspension surgery s/t coughing violently - is currently following clinical trial for repeat surgery, states expected in June.  Taking Mucinex PRN with benefit.  Using Trelegy - however not on regular basis, states using approx 2x per week. Using Albuterol PRN - using approx 1-2x per week with great benefit.   Patient Instructions   SOB suspected to be secondary to asthma - you had 19% bronchodilator response on your PFT to smaller airways.  Recommend taking Trelegy once per day. This inhaler contains an inhaled steroid component. Rinse mouth after each use due to risk for thrush development. If mouth or  "tongue develops white sores please contact the clinic and I will order a prescription mouth wash.   Continue to use Albuterol as needed.  Keep Prednisone on hand - use sparingly. Take only as needed.  Codeine cough syrup ordered - you are high sedation risk due to other narcotic medications. Please do not mix codeine cough syrup with other sedating medications (Percocet, Gabapentin, etc). Do not mix with alcohol. Do not drive after use, will make you drowsy.  Continue current medication regiment. Keep follow up appointment as scheduled. Please call the office if you have any questions or concerns.           03/10/2023:  Completed Levaquin and Prednisone regiment since prior office visit - reports benefit with Prednisone use. States she had resolution of symptoms with medications however she did have a prolonged course to recover, states it took weeks for cough to improve.  Shortness of breath has been intermittent over the last two years however is occurring more frequently over the last two weeks. Occurs with conversation, walking short distances throughout the home and to the mailbox. Improves with prolonged rest. States "It feels like my lungs are a balloon with a belt tied around them." States she can only eat small meals before her stomach gets bloated, distended causing worsening shortness of breath. Does endorse 50# weight gain over the last couple of years due to prolonged recovery requiring wheelchair, walker. Patient would like to be more active, wants to start swimming again however is hesitant due to breathing.  Using Trelegy once per day, doesn't appreciate much benefit. Using Albuterol as needed with benefit, approx 3x per day.   States "Something is wrong."   Patient Instructions   Etiology of SOB is unknown at this time.   Check BNP, CMP, and D-Dimer STAT.  Depending on results, will order CTA vs CT Chest.  I ordered a Lung Function Test to evaluate lung strength. Please complete prior to next " "appointment.   Check six minute walk test to see if you qualify for supplemental oxygen.  Continue Trelegy and Albuterol as prescribed.  Continue current medication regiment. Keep follow up appointment as scheduled. Please call the office if you have any questions or concerns.           1/24/2023:   has been sick for approx 2 weeks now - tested negative on 1/21 for COVID and Flu on Saturday. States  came home sick from work, in which COVID was spreading.   Reports recent fever - TMAX 101 - states had fever last night.   Cough: Persistent - productive with yellow mucous - states was brown in color however has now turned yellow. Worse in the evening.   Shortness of breath: Worsening from baseline - states she is requiring inhaler use every two hours.   Was seen at local  on 1/21 and was prescribed Augmentin - states she has since developed severe diarrhea.   Had a bladder sling placed a few weeks ago - states the cough has been so severe causing her to "bust it."   Patient Instructions   Prednisone - take as prescribed.  Can stop Augmentin and switch to Levaquin - take as prescribed.  Chest xray from [facility] from today does not show acute lung disease. Shows great improvement in comparison to CXR from Aug 2021.  Repeat CXR in 1 week.  Start taking Trelegy once per day.  Continue to use Albuterol as needed for shortness of breath, wheezing, cough.  Continue current medication regiment. Keep follow up appointment as scheduled. Please call the office if you have any questions or concerns.         10/26/2022: pt was healthy - dental assistant.  Had mrsa post breast reduction in 2004.  Had low igg -- pt had injections and immunizations and got off rx.    In 2006 - 7 of 14 good titers for pneumococcus. Was 3/14 prior to vaccines?    Family has asthma-- no childhood asthma.    Pt had gastric bypass prior to 2006.  Monitors intake/vitamens.   Pt had fall off rope swing 2 yrs ago with subsequent fx left ankle " needing numerous surgeries.  Pt had empyema 8/2021 -- -- pt had gastric ulcer cautery and eso dilation -- later returned with right effusion and later right chest tube --- pt eventually went to [facility] with VAT[facility] to thoracotomy/decortication-- pt had not had recent cxr or swallow evaluation.    No fever nor night sweats.  No aspiration. Has regurg into mouth 5 /wk.  Sleeps hob elevated.  No osas but snores.  Sleep study prior to bypass.   No asthma but uses albuterol inhaler prn when very sickly every couple yrs or so.  Uses albuterol once a wk or less.  Will have noctural arousal with wheezes.  Inderal started last yr with min improvement wrt migranes-- skips inderal   Chr dry mouth on numerous   Pt said to have narcolepsy-- dextroamphetamine ppt cough and wheezes per pt. No cataplexy.  No eds now.  Uses aderal and vvyanse.  Post thoracotomy with cough/sob, occ noc wheezes, more albuterol use.  Vague swallow problems with regurg-- no aspiration.  Patient Instructions   Inderal/propranolol would not be good to use with wheezes- you aren't using  Need to follow up chest xray with empyema.  Albuterol needs suggest airway disease  Your esophagus had been problem -- very severe til dilation.  You are having regurg and esophagus problems.  Should have follow up as esophagus may contribute or cause problems?  Would check lung function  Would recommend trial trelegy once daily  200- you may have asthma.Use albuterol as needed.  Would re check immune system -- no apparent increase infections except around right lung.  Not urgent.  May do external.   Will get back with results over phone.  May  need more evaluation..   Could consider salvia therapy -- need to be cleared by Dr Beard.              PFSH: The patient varies with instability at times.   Past Medical History:   Diagnosis Date    Allergy     Anxiety     Depression     Empyema of right pleural space 2021    Encounter for blood transfusion     Esophageal  dysphagia     Fibromyalgia     Gastric bypass status for obesity     H/O dental abscess 04/14/2014    drained    Headache(784.0)     migraines.  Treated at [facility] (Dr. 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  Pt states took too many then realized her mistake    Polycystic ovaries     S/P JUHI-BSO     Thyroid disease     hypothyroidism    Wears partial dentures     upper         Past Surgical History:   Procedure Laterality Date    ABDOMINAL SURGERY      ANKLE HARDWARE REMOVAL Left 3/28/2022    Procedure: REMOVAL, HARDWARE, ANKLE;  Surgeon: Fer Mcrae MD;  Location: [address];  Service: Orthopedics;  Laterality: Left;    APPLICATION OF LARGE EXTERNAL FIXATION DEVICE TO TIBIA Left 9/7/2020    Procedure: APPLICATION, EXTERNAL FIXATION DEVICE, TIBIA;  Surgeon: Sujata Londono MD;  Location: [address];  Service: Orthopedics;  Laterality: Left;  need paralysis    APPLICATION OF WOUND VACUUM-ASSISTED CLOSURE DEVICE Left 9/18/2020    Procedure: APPLICATION, WOUND VAC;  Surgeon: eFr Mcrae MD;  Location: [facility] OR [address] [place] [address];  Service: Orthopedics;  Laterality: Left;    BREAST SURGERY  2004    Breast Reduction    CARDIAC CATHETERIZATION      COLONOSCOPY N/A 11/3/2015    Procedure: COLONOSCOPY;  Surgeon: Timothy Barber MD;  Location: [facility];  Service: Endoscopy;  Laterality: N/A;    CYSTOSCOPY WITH INJECTION OF PERIURETHRAL BULKING AGENT N/A 9/14/2023    Procedure: CYSTOSCOPY, WITH PERIURETHRAL BULKING AGENT INJECTION;  Surgeon: David Perez MD;  Location: [facility] OR;   Service: Urology;  Laterality: N/A;    CYSTOSCOPY WITH URODYNAMIC TESTING N/A 4/17/2023    Procedure: CYSTOSCOPY, WITH URODYNAMIC TESTING;  Surgeon: David Perez MD;  Location: [facility] OR [address] [place] [address];  Service: Urology;  Laterality: N/A;  1 hr    DENTAL SURGERY      4 teeth removed    GASTRIC BYPASS      HERNIA REPAIR      HYSTERECTOMY      ILIAC CREST BONE GRAFT Left 3/28/2022    Procedure: BONE GRAFT, ILIAC CREST;  Surgeon: Fer Mcrae MD;  [facility];  Service: Orthopedics;  Laterality: Left;    INJECTION OF ANESTHETIC AGENT AROUND NERVE N/A 3/29/2022    Procedure: Block, Nerve;  Surgeon: Alexus Surgeon;  Location: [facility];  Service: Anesthesiology;  Laterality: N/A;    OPEN REDUCTION AND INTERNAL FIXATION (ORIF) OF PILON FRACTURE Left 9/18/2020    Procedure: ORIF, FRACTURE, PILON - left. Diving board, supine, bone foam. Seda anterolateral distal tibial plate, mini frag, long 3.5mm steel screw, CaPhos bone substitute;  Surgeon: Fer Mcrae MD;  Location: [address];  Service: Orthopedics;  Laterality: Left;    OPEN REDUCTION AND INTERNAL FIXATION (ORIF) OF PILON FRACTURE Left 3/28/2022    Procedure: ORIF, FRACTURE, PILON nonunion + Iliac crest bone graft - diving board, supine, bone foam. Seda axsos3 screws, 2.7 mini plates/screws, curettes, [employer] medical Augment;  Surgeon: Fer Mcrae MD;  Location: [address];  Service: Orthopedics;  Laterality: Left;  Spinal? + postop catheter (draping out pelvis for bone graft harvest)    OVARIAN CYST REMOVAL      aprox 5 times    REMOVAL OF EXTERNAL FIXATION DEVICE Left 9/18/2020    Procedure: REMOVAL, EXTERNAL FIXATION DEVICE;  Surgeon: Fer Mcrae MD;  Location: [address];  Service: Orthopedics;  Laterality: Left;    ROTATOR CUFF REPAIR Left 01/2019    SURGICAL PROCEDURE FOR STRESS INCONTINENCE USING TENSION FREE VAGINAL TAPE N/A 12/14/2022    Procedure: SURGICAL PROCEDURE,  USING TENSION FREE VAGINAL TAPE, FOR STRESS INCONTINENCE;  Surgeon: Frandy Sherman MD;  Location: [facility] OR;  Service: OB/GYN;  Laterality: N/A;  cysto    tennis elbow repair Left 01/2019    UPPER GASTROINTESTINAL ENDOSCOPY       Social History     Tobacco Use    Smoking status: Never    Smokeless tobacco: Never    Tobacco comments:     marijuana   Substance Use Topics    Alcohol use: Yes     Comment: rarely    Drug use: Yes     Types: Amphetamines, Barbituates, Marijuana     Family History   Problem Relation Age of Onset    Anxiety disorder Mother     Depression Mother     Suicide Mother     Seizures Mother     Drug abuse Mother     Ovarian cancer Mother     Aortic aneurysm Father     Glaucoma Maternal Grandmother     Colon cancer Neg Hx     Breast cancer Neg Hx     Diabetes Neg Hx     Hypertension Neg Hx      Review of patient's allergies indicates:   Allergen Reactions    Cephalexin Anaphylaxis    Nsaids (non-steroidal anti-inflammatory drug) Other (See Comments)     Has a history of bleeding ulcers    Codeine Itching          Review of Systems:  a review of eleven systems covering constitutional, Eye, HEENT, Psych, Respiratory, Cardiac, GI, , Musculoskeletal, Endocrine, Dermatologic was negative except for pertinent findings as listed ABOVE and below:  pertinent positive as above, rest is good       Exam: Exam included Vitals as listed, and patient's appearance and affect and alertness and mood, oral exam for yeast and hygiene and pharynx lesions and Mallapatti (M) score, neck with inspection for jvd and masses and thyroid abnormalities and lymph nodes (supraclavicular and infraclavicular nodes and axillary also examined and noted if abn), chest exam included symmetry and effort and fremitus and percussion and auscultation, cardiac exam included rhythm and gallops and murmur and rubs and jvd and edema, abdominal exam for mass and hepatosplenomegaly and tenderness and hernias and bowel sounds, Musculoskeletal  exam with muscle tone and posture and mobility/gait and  strength, and skin for rashes and cyanosis and pallor and turgor, extremity for clubbing.  Findings were normal except for pertinent findings listed below: AAOx4, Conversing appropriately, S1S2, Clear breath sounds on room air, in no acute distress. No clubbing. No lower extremity swelling.     Wt Readings from Last 3 Encounters:   03/07/24 76.5 kg (168 lb 12.2 oz)   03/06/24 75.8 kg (167 lb 1.7 oz)   11/08/23 75.8 kg (167 lb 3.5 oz)     Temp Readings from Last 3 Encounters:   09/14/23 98 °F (36.7 °C)   04/17/23 98.1 °F (36.7 °C) (Temporal)   12/14/22 98.1 °F (36.7 °C) (Skin)     BP Readings from Last 3 Encounters:   03/07/24 113/79   11/08/23 112/82   11/07/23 113/83     Pulse Readings from Last 3 Encounters:   03/07/24 92   11/08/23 61   11/07/23 87         Radiographs (ct chest and cxr) reviewed: view by direct vision  -- ct chest 8/2/2021 sm right effusion, 8/6/2021 lg empeyma    Chest Xray 1/24/2023:  Image viewed from disk from [facility] - radiology interpretation as below  IMPRESSION  Mild interstitial prominence appearing chronic with no acute findings in the chest.  Stable mild elevation of the right hemidiaphragm.         Patient's labs were reviewed including CBC and CMP    Lab Results   Component Value Date    WBC 17.30 (H) 03/29/2022    HGB 11.1 (L) 03/29/2022    HCT 37.0 03/29/2022    MCV 86 03/29/2022     03/29/2022       CMP  Sodium   Date Value Ref Range Status   03/10/2023 140 136 - 145 mmol/L Final     Potassium   Date Value Ref Range Status   03/10/2023 4.1 3.5 - 5.1 mmol/L Final     Chloride   Date Value Ref Range Status   03/10/2023 113 (H) 95 - 110 mmol/L Final     CO2   Date Value Ref Range Status   03/10/2023 20 (L) 23 - 29 mmol/L Final     Glucose   Date Value Ref Range Status   03/10/2023 98 70 - 110 mg/dL Final     BUN   Date Value Ref Range Status   03/10/2023 11 6 - 20 mg/dL Final     Creatinine   Date Value Ref Range Status    03/10/2023 0.7 0.5 - 1.4 mg/dL Final   01/03/2013 0.8 0.5 - 1.4 mg/dL Final     Calcium   Date Value Ref Range Status   09/11/2023 9.3 8.7 - 10.5 mg/dL Final   01/03/2013 8.6 (L) 8.7 - 10.5 mg/dL Final     Total Protein   Date Value Ref Range Status   03/10/2023 6.7 6.0 - 8.4 g/dL Final     Albumin   Date Value Ref Range Status   09/11/2023 3.8 3.5 - 5.2 g/dL Final     Total Bilirubin   Date Value Ref Range Status   03/10/2023 0.2 0.1 - 1.0 mg/dL Final     Comment:     For infants and newborns, interpretation of results should be based  on gestational age, weight and in agreement with clinical  observations.    Premature Infant recommended reference ranges:  Up to 24 hours.............<8.0 mg/dL  Up to 48 hours............<12.0 mg/dL  3-5 days..................<15.0 mg/dL  6-29 days.................<15.0 mg/dL       Alkaline Phosphatase   Date Value Ref Range Status   03/10/2023 79 55 - 135 U/L Final   12/31/2012 77 55 - 135 U/L Final     AST   Date Value Ref Range Status   03/10/2023 16 10 - 40 U/L Final   12/31/2012 14 10 - 40 U/L Final     ALT   Date Value Ref Range Status   03/10/2023 23 10 - 44 U/L Final     Anion Gap   Date Value Ref Range Status   03/10/2023 7 (L) 8 - 16 mmol/L Final   01/03/2013 12 5 - 15 meq/L Final     eGFR   Date Value Ref Range Status   03/10/2023 >60 >60 mL/min/1.73 m^2 Final         PFT will be done and results to be reviewed       Plan:  Clinical impression is apparently straight forward and impression with management as below.     Faith was seen today for asthma.    Diagnoses and all orders for this visit:    Chronic cough    Immune deficiency disorder    History of pleural empyema    Mild intermittent asthma without complication  -     predniSONE (DELTASONE) 20 MG tablet; Take one pill per day for three days, can repeat as needed for shortness of breath, wheezing, cough.  -     azithromycin (ZITHROMAX) 500 MG tablet; Take one pill per day for three days as needed for  green/yellow mucous production, cough.  -     benzonatate (TESSALON PERLES) 100 MG capsule; Take 1 capsule (100 mg total) by mouth 3 (three) times daily as needed for Cough.  -     guaiFENesin-codeine 100-10 mg/5 ml (TUSSI-ORGANIDIN NR)  mg/5 mL syrup; Take 5 mLs by mouth nightly as needed for Cough.    Bronchitis    Yeast infection  -     fluconazole (DIFLUCAN) 150 MG Tab; Take 1 tablet (150 mg total) by mouth once daily.          Follow up in about 6 months (around 9/7/2024), or if symptoms worsen or fail to improve.    Discussed with patient above for education the following:      Patient Instructions   Continue current regimen including BREO once per day. This inhaler contains an inhaled steroid component. Rinse mouth after each use due to risk for thrush development. If mouth or tongue develops white sores please contact the clinic and I will order a prescription mouth wash.     Continue to use Albuterol as needed.    Keep Prednisone on hand - use sparingly. Take only as needed.    Azithromycin ordered - this is only to be taken as needed for green/yellow mucous.     Codeine cough syrup ordered - you are high sedation risk due to other narcotic medications. Please do not mix codeine cough syrup with other sedating medications (Percocet, Gabapentin, etc). Do not mix with alcohol. Do not drive after use, will make you drowsy.    Tessalon ordered - only take as needed.     Continue current medication regiment. Keep follow up appointment as scheduled. Please call the office if you have any questions or concerns.            Home

## 2024-08-09 NOTE — OB RN PATIENT PROFILE - PRO HBSAG INFANT
Office visit  Patient: Rama Blair   8/9/2024     Assessment:     1. History of Ariana-en-Y gastric bypass    2. HTN (hypertension), benign    3. Mixed hyperlipidemia    4. Class 3 severe obesity due to excess calories with serious comorbidity and body mass index (BMI) of 40.0 to 44.9 in adult      Plan:       1. History of Ariana-en-Y gastric bypass  -     COMPREHENSIVE METABOLIC PANEL; Future; Expected date: 08/09/2024  -     CBC W/ AUTO DIFFERENTIAL; Future; Expected date: 08/09/2024  -     Magnesium; Future; Expected date: 08/09/2024  -     PHOSPHORUS; Future; Expected date: 08/09/2024        -follow up with surgery regarding inability to eat solid food    2. HTN (hypertension), benign       -stable, continue holding antihypertensives while blood pressure is normal    3. Mixed hyperlipidemia       -stable    4. Class 3 severe obesity due to excess calories with serious comorbidity and body mass index (BMI) of 40.0 to 44.9 in adult       -s/p bariatric surgery    5. Vomiting, unspecified vomiting type, unspecified whether nausea present  -     X-Ray Abdomen Flat And Erect; Future; Expected date: 08/09/2024        -will obtain x-ray to rule out bowel obstruction    6. Abdominal pain, unspecified abdominal location  -     X-Ray Abdomen Flat And Erect; Future; Expected date: 08/09/2024        CHIEF COMPLAINT: follow up bariatric surgery    HPI: Rama Blair is a 49 y.o. female who presents for follow up.  She recently underwent bariatric surgery.  She still is unable to eat solids.  Yesterday, she underwent EGD and underwent balloon dilatation.  She reports eating soft foods but they keep coming back up.  She's been able to eat smoothies.  She's been drinking protein powder and water.  She was told to follow up with her primary care doctor.  She initially had diarrhea for 3 days, but now she hasn't had a bowel movement in 4 days.  She reports she hasn't passed gas.  She has stomach pain.  She's urinating  "okay.  She reports 6/10 stomach pain in her periumbilical area.      Current Outpatient Medications   Medication Instructions    albuterol (PROVENTIL/VENTOLIN HFA) 90 mcg/actuation inhaler INHALE 1 PUFFS BY MOUTH INTO THE LUNGS EVERY 6 HOURS AS NEEDED FOR WHEEZING. RESCUE    blood pressure monitor (IHE[employer] EASE) XL arm size (OP) Other, As needed (PRN)    ezetimibe (ZETIA) 10 mg, Oral, Daily    losartan (COZAAR) 100 mg, Oral    pantoprazole (PROTONIX) 40 MG tablet TAKE 1 TABLET(40 MG) BY MOUTH EVERY DAY    rosuvastatin (CRESTOR) 40 mg, Oral, Nightly    scopolamine (TRANSDERM-SCOP) 1.3-1.5 mg (1 mg over 3 days) SMARTSIG:Topical    spironolactone (ALDACTONE) 25 mg, Oral, Daily    traZODone (DESYREL) 50 mg, Oral, Nightly PRN       Lab Results   Component Value Date    HGBA1C 5.5 05/24/2022    HGBA1C 5.4 05/06/2015    HGBA1C 5.3 08/01/2012     No results found for: "MICALBCREAT"  Lab Results   Component Value Date    LDLCALC 113.4 11/03/2023    LDLCALC 121.8 11/14/2022    CHOL 181 11/03/2023    HDL 53 11/03/2023    TRIG 73 11/03/2023       Lab Results   Component Value Date     04/26/2024    K 3.9 04/26/2024     04/26/2024    CO2 25 04/26/2024    GLU 96 04/26/2024    BUN 27 (H) 04/26/2024    CREATININE 1.1 04/26/2024    CALCIUM 9.4 04/26/2024    PROT 7.4 04/26/2024    ALBUMIN 3.4 (L) 04/26/2024    BILITOT 0.4 04/26/2024    ALKPHOS 61 04/26/2024    AST 20 04/26/2024    ALT 13 04/26/2024    ANIONGAP 10 04/26/2024    ESTGFRAFRICA >60 06/27/2022    EGFRNONAA >60 06/27/2022    WBC 8.85 04/26/2024    HGB 11.4 (L) 04/26/2024    HGB 12.4 11/22/2023    HCT 36.9 (L) 04/26/2024    MCV 90 04/26/2024     04/26/2024    TSH 2.942 05/09/2022    HEPCAB Negative 11/22/2023       Lab Results   Component Value Date    JNAJTBPZ27 984 (H) 08/20/2021    FERRITIN 57 05/09/2022    IRON 47 05/09/2022    TRANSFERRIN 215 05/09/2022    TIBC 318 05/09/2022    FESATURATED 15 (L) 05/09/2022         Past Medical History:   Diagnosis " Date    Anemia, unspecified     Constipation     GI Dr Reece 2014    COVID-19     Elevated platelet count 03/11/2021    Refer hematol 3/2021    Familial hypercholesteremia     per digital lipid    Gastritis 07/30/2021 2021    Gastroesophageal reflux disease without esophagitis 08/28/2018    She states she had EGD in past    Grief 09/19/2017    mom passed 2017    HTN (hypertension), benign 03/10/2015    Digital med 12/20    Intestinal erosis 09/17/2021    CS 2021    Iron deficiency anemia secondary to inadequate dietary iron intake 08/26/2021    Start OTC 8/21    Knee pain, left 2012    scope, Dr Reyes, injury at work, treadmill helps with stiffness    Low blood potassium 07/30/2021    avoid HCTZ    Mixed hyperlipidemia 08/29/2013    Morbid obesity 02/23/2015    bariatrics not covered by insurance    SOB (shortness of breath)     Spinal cord stimulator status 08/28/2018    In back for L knee pain, Dr Kessler surgery & pain mgmt, Lyrica or Hydrocodone at night    Thyroid disease      Past Surgical History:   Procedure Laterality Date    COLONOSCOPY N/A 9/17/2021    Procedure: COLONOSCOPY;  Surgeon: Keith Mckeon MD;  Location: [facility] (4TH FLR);  Service: Endoscopy;  Laterality: N/A;  prep ins. emailed / COVID screening [facility] [place] 2nd floor 9/14/21- ERW    DILATION AND CURETTAGE OF UTERUS      ESOPHAGOGASTRODUODENOSCOPY N/A 7/27/2021    Procedure: ESOPHAGOGASTRODUODENOSCOPY (EGD);  Surgeon: Keith Mckeon MD;  Location: [facility] (4TH FLR);  Service: Endoscopy;  Laterality: N/A;  COVID test 7/24 [facility]-    HYSTEROSCOPY WITH DILATION AND CURETTAGE OF UTERUS N/A 9/21/2022    Procedure: HYSTEROSCOPY, WITH DILATION AND CURETTAGE OF UTERUS;  Surgeon: Kim Juarez MD;  Location: [facility];  Service: OB/GYN;  Laterality: N/A;    KNEE ARTHROSCOPY      Eric MOHAMUD    KNEE SURGERY      SKIN GRAFT      left finger due to injury    SPINAL CORD STIMULATOR IMPLANT      to L knee, Dr Kessler    TUBAL LIGATION         Social History  "    Tobacco Use    Smoking status: Never    Smokeless tobacco: Never   Substance Use Topics    Alcohol use: No    Drug use: No       family history includes COPD in her mother; Diabetes in her mother; Hypertension in her mother.    Vitals:    08/09/24 1441   BP: 104/66   Pulse: 97   SpO2: 99%   Weight: 116.8 kg (257 lb 8 oz)   Height: 5' 7" (1.702 m)   PainSc:   6     Objective:   Physical Exam  Vitals reviewed.   Constitutional:       General: She is not in acute distress.     Appearance: Normal appearance. She is well-developed. She is obese.   HENT:      Head: Normocephalic and atraumatic.      Right Ear: External ear normal.      Left Ear: External ear normal.      Nose: Nose normal.      Mouth/Throat:      Mouth: Mucous membranes are moist.   Eyes:      General: No scleral icterus.        Right eye: No discharge.         Left eye: No discharge.      Conjunctiva/sclera: Conjunctivae normal.   Cardiovascular:      Rate and Rhythm: Normal rate and regular rhythm.      Heart sounds: Normal heart sounds. No murmur heard.     No friction rub. No gallop.   Pulmonary:      Effort: Pulmonary effort is normal. No respiratory distress.      Breath sounds: Normal breath sounds. No wheezing, rhonchi or rales.   Abdominal:      Palpations: Abdomen is soft. There is no mass.      Tenderness: There is abdominal tenderness.      Comments: Hyperactive bowel sounds   Musculoskeletal:         General: No deformity.      Right lower leg: No edema.      Left lower leg: No edema.   Skin:     General: Skin is warm and dry.   Neurological:      General: No focal deficit present.      Mental Status: She is alert and oriented to person, place, and time.   Psychiatric:         Mood and Affect: Mood normal.         Behavior: Behavior normal.         Thought Content: Thought content normal.             Lakeshia Obrien MD  Internal Medicine and Pediatrics                            " negative

## 2024-08-21 NOTE — PROGRESS NOTE ADULT - ASSESSMENT
A/P:  26yo  at 38w0d presenting with intermittent cramping/ctx - MFM consulted to rule out labor. VSS. No cervical change. exam c/w prelabor ctx. No indicaiton for delivery at this time. Rec to review precautions for return.    Patient d/w Dr. Landis (M attending)    Osmin Nelson M.D. PGY-6  Maternal Fetal Medicine Fellow  Cell: [phone] if after 5pm or weekend ask labor and delivery for on call fellow  
Detail Level: Detailed

## 2025-04-22 NOTE — OB PROVIDER TRIAGE NOTE - NS_FHRVARIABILITY_OBGYN_ALL_OB
Referral from [facility] for dx. [id number] (ICD-10-CM) - Age-related osteoporosis without current pathological fracture         Labs:   Phosphorus  Order: [id number]   Status: Final result       Dx: Osteoporosis, postmenopausal    Test Result Released: Yes (seen)    Specimen Information: Blood, Venous   0 Result Notes      Component  Ref Range & Units (hover) 10 mo ago   Phosphorus 4.1      Magnesium  Order: [id number]   Status: Final result       Dx: Osteoporosis, postmenopausal    Test Result Released: Yes (seen)    Specimen Information: Blood, Venous   0 Result Notes       Component  Ref Range & Units (hover) 10 mo ago 1 yr ago   Magnesium 2.0 2.1   [facility]ing [facility]        Component  Ref Range & Units (hover) 10 mo ago  (6/4/24) 9 yr ago  (3/1/16) 9 yr ago  (5/28/15) 12 yr ago  (7/26/12) 13 yr ago  (5/10/11)   TSH 2.854 3.123 CM 2.690 R 1.784 2.535   Contains abnormal data Vitamin D -25 Hydroxy  Order: [id number]   Status: Final result       Dx: Osteoporosis, postmenopausal    Test Result Released: Yes (seen)    Specimen Information: Blood, Venous   0 Result Notes       Component  Ref Range & Units (hover) 10 mo ago 6 yr ago   Vitamin D, 25-Hydroxy 25.0 Low  24.6 Low  CM   Comment: <20 ng/mL  =  Vitamin D deficiency  20-29 ng/mL  =  Vitamin D insufficiency   ng/mL  =  Optimal Vitamin D  >150 ng/mL  =  Possible toxicity   Resulting Agency [facility]                 Imaging:     Dexa scan 3/28/24  IMPRESSION:     1.  Osteoporosis.  2.  Consider/continue pharmacologic therapy, as appropriate.  3. Consider repeat bone mineral density testing in 2 years, as clinically  appropriate.          4/22/25 Referral summary completed.     Moderate Variability